# Patient Record
Sex: FEMALE | Race: WHITE | NOT HISPANIC OR LATINO | Employment: PART TIME | URBAN - METROPOLITAN AREA
[De-identification: names, ages, dates, MRNs, and addresses within clinical notes are randomized per-mention and may not be internally consistent; named-entity substitution may affect disease eponyms.]

---

## 2018-03-23 ENCOUNTER — OFFICE VISIT (OUTPATIENT)
Dept: OBSTETRICS AND GYNECOLOGY | Facility: CLINIC | Age: 38
End: 2018-03-23
Payer: COMMERCIAL

## 2018-03-23 VITALS
WEIGHT: 140.63 LBS | HEIGHT: 62 IN | RESPIRATION RATE: 13 BRPM | SYSTOLIC BLOOD PRESSURE: 130 MMHG | HEART RATE: 69 BPM | DIASTOLIC BLOOD PRESSURE: 82 MMHG | BODY MASS INDEX: 25.88 KG/M2

## 2018-03-23 DIAGNOSIS — Z01.419 WELL WOMAN EXAM WITH ROUTINE GYNECOLOGICAL EXAM: Primary | ICD-10-CM

## 2018-03-23 DIAGNOSIS — Z12.4 CERVICAL CANCER SCREENING: ICD-10-CM

## 2018-03-23 DIAGNOSIS — N92.0 MENORRHAGIA WITH REGULAR CYCLE: ICD-10-CM

## 2018-03-23 PROCEDURE — 88175 CYTOPATH C/V AUTO FLUID REDO: CPT

## 2018-03-23 PROCEDURE — 99385 PREV VISIT NEW AGE 18-39: CPT | Mod: S$GLB,,, | Performed by: OBSTETRICS & GYNECOLOGY

## 2018-03-23 PROCEDURE — 99999 PR PBB SHADOW E&M-NEW PATIENT-LVL III: CPT | Mod: PBBFAC,,, | Performed by: OBSTETRICS & GYNECOLOGY

## 2018-03-23 PROCEDURE — 87624 HPV HI-RISK TYP POOLED RSLT: CPT

## 2018-03-23 RX ORDER — GUAIFENESIN AND PHENYLEPHRINE HCL 400; 10 MG/1; MG/1
TABLET ORAL
COMMUNITY
End: 2019-06-25

## 2018-03-23 NOTE — PROGRESS NOTES
Subjective:    Patient ID: Awilda Mckeon is a 37 y.o. y.o. female.     Chief Complaint: Annual Well Woman Exam     History of Present Illness:  Awilda HANLEY presents today for Annual Well Woman exam. She describes her menses as regular every month without intermenstrual spotting, but cycles have always been heavy and last about 7 days.  She denies pelvic pain.  She denies breast tenderness, masses, nipple discharge. She denies difficulty with urination or bowel movements. She denies menopausal symptoms such as hotflashes, vaginal dryness, and night sweats. She denies bloating, early satiety, or weight changes. She is sexually active. Contraception is by oral contraceptives (estrogen/progesterone).      Menstrual History:   Patient's last menstrual period was 2018..     OB History      Para Term  AB Living    2 2 2          SAB TAB Ectopic Multiple Live Births                       The following portions of the patient's history were reviewed and updated as appropriate: allergies, current medications, past family history, past medical history, past social history, past surgical history and problem list.    ROS:   CONSTITUTIONAL: sweats; Negative for fever, chills, weakness, fatigue, weight loss, weight gain  ENT: hearing loss, negative for sore throat, nasal congestion, nasal discharge, epistaxis, tinnitus,   EYES: negative for blurry vision, decreased vision, loss of vision, eye pain, diplopia, photophobia, discharge  SKIN: rash, itching, Negative for acne or irregular hair growth  RESPIRATORY: negative for cough, hemoptysis, shortness of breath, pleuritic chest pain, wheezing  CARDIOVASCULAR: negative for chest pain, dyspnea on exertion, orthopnea, paroxysmal nocturnal dyspnea, edema, palpitations  BREAST: negative for breast  tenderness, breast mass, nipple discharge, or skin changes  GASTROINTESTINAL: negative for abdominal pain, flank pain, nausea, vomiting, diarrhea, constipation, black  stool, blood in stool  GENITOURINARY: menorrhagia with dysmenorrhea; negative for abnormal vaginal bleeding, amenorrhea, decreased libido, dysuria, genital sores, hematuria, incontinence, pelvic pain, urinary frequency, vaginal discharge  HEMATOLOGIC/LYMPHATIC: negative for swollen lymph nodes, bleeding, bruising  MUSCULOSKELETAL: joint pain, joint swelling, negative for back pain, joint stiffness, muscle pain, muscle weakness  NEUROLOGICAL: negative for dizzy/vertigo, headache, focal weakness, numbness/tingling, speech problems, loss of consciousness, confusion, memory loss  BEHAVORIAL/PSYCH: negative for anxiety, depression, psychosis  ENDOCRINE: negative for polydipsia/polyuria, palpitations, skin changes, temperature intolerance, unexpected weight changes      Objective:    Vital Signs:  Vitals:    03/23/18 1157   BP: 130/82   Pulse: 69   Resp: 13       Physical Exam:  General:  alert, cooperative, no distress   Skin:  Skin color, texture, turgor normal. No rashes or lesions   HEENT:  conjunctivae/corneas clear. PERRL.   Neck: supple, trachea midline, no adenopathy or thyromegally   Respiratory:  Normal effort   Breasts:  no discharge, erythema, tenderness, or palpable masses; no axillary lymphadenopathy   Abdomen:  soft, nontender, no palpable masses   Pelvis: External genitalia: normal general appearance  Urinary system: urethral meatus normal, bladder nontender  Vaginal: normal mucosa without prolapse or lesions  Cervix: normal appearance  Uterus: normal size, shape, position  Adnexa: normal size, nontender bilaterally   Extremities: Normal ROM; no edema, no cyanosis   Neurologial: Normal strength and tone. No focal numbness or weakness.   Psychiatric: normal mood, speech, dress, and thought processes         Assessment:       Healthy female exam.     1. Well woman exam with routine gynecological exam    2. Cervical cancer screening    3. Menorrhagia with regular cycle          Plan:      Well woman exam with  routine gynecological exam    Cervical cancer screening  -     Liquid-based pap smear, screening  -     HPV High Risk Genotypes, PCR    Menorrhagia with regular cycle    Discussed medicine options for menorrhagia as well as surgical options including Novasure.  Pt will research Novasure more at home and contact clinic.    COUNSELING:  Awilda HANLEY was counseled on STD pevention, use and side-effects of various contraceptive measures, A.C.O.G. Pap guidelines and recommendations for yearly pelvic exams in addition to recommendations for monthly self breast exams; to see her PCP for other health maintenance.

## 2018-03-28 LAB
HPV16 AG SPEC QL: NEGATIVE
HPV16+18+H RISK 12 DNA CVX-IMP: NEGATIVE
HPV18 DNA SPEC QL NAA+PROBE: NEGATIVE

## 2019-06-25 ENCOUNTER — OFFICE VISIT (OUTPATIENT)
Dept: OBSTETRICS AND GYNECOLOGY | Facility: CLINIC | Age: 39
End: 2019-06-25
Payer: COMMERCIAL

## 2019-06-25 VITALS
HEIGHT: 62 IN | SYSTOLIC BLOOD PRESSURE: 116 MMHG | BODY MASS INDEX: 25.52 KG/M2 | WEIGHT: 138.69 LBS | RESPIRATION RATE: 13 BRPM | DIASTOLIC BLOOD PRESSURE: 74 MMHG | HEART RATE: 65 BPM

## 2019-06-25 DIAGNOSIS — N92.0 MENORRHAGIA WITH REGULAR CYCLE: ICD-10-CM

## 2019-06-25 DIAGNOSIS — Z01.419 WELL WOMAN EXAM WITH ROUTINE GYNECOLOGICAL EXAM: Primary | ICD-10-CM

## 2019-06-25 PROCEDURE — 99395 PR PREVENTIVE VISIT,EST,18-39: ICD-10-PCS | Mod: S$GLB,,, | Performed by: OBSTETRICS & GYNECOLOGY

## 2019-06-25 PROCEDURE — 99395 PREV VISIT EST AGE 18-39: CPT | Mod: S$GLB,,, | Performed by: OBSTETRICS & GYNECOLOGY

## 2019-06-25 PROCEDURE — 99999 PR PBB SHADOW E&M-EST. PATIENT-LVL III: CPT | Mod: PBBFAC,,, | Performed by: OBSTETRICS & GYNECOLOGY

## 2019-06-25 PROCEDURE — 99999 PR PBB SHADOW E&M-EST. PATIENT-LVL III: ICD-10-PCS | Mod: PBBFAC,,, | Performed by: OBSTETRICS & GYNECOLOGY

## 2019-06-25 NOTE — PROGRESS NOTES
Subjective:    Patient ID: Awilda Mckeon is a 38 y.o. y.o. female.     Chief Complaint: Annual Well Woman Exam     History of Present Illness:  Awilda HANLEY presents today for Annual Well Woman exam. She describes her menses as regular every month without intermenstrual spotting and usually lasting 6 to 8 days but periods are very heavy with passage of clots. She is interested in surgical management with possible Novasure ablation.She denies pelvic pain.  She denies breast tenderness, masses, nipple discharge. She denies difficulty with urination or bowel movements. She reports menopausal symptoms such as hotflashes, vaginal dryness, and night sweats. She denies bloating, early satiety, or weight changes. She is sexually active. Contraception is by vasectomy.      Menstrual History:   Patient's last menstrual period was 2019..     OB History    :  2  Para:  2  Term:  2            The following portions of the patient's history were reviewed and updated as appropriate: allergies, current medications, past family history, past medical history, past social history, past surgical history and problem list.    ROS:   CONSTITUTIONAL: diaphoresis, Denies: fever, chills, weakness, fatigue, weight loss, weight gain  ENT: hearing loss, Denies: sore throat, nasal congestion, nasal discharge, epistaxis, tinnitus  EYES: negative for blurry vision, decreased vision, loss of vision, eye pain, diplopia, photophobia, discharge  SKIN: itching, Denies: rash, hives  RESPIRATORY: negative for cough, hemoptysis, shortness of breath, pleuritic chest pain, wheezing  CARDIOVASCULAR: negative for chest pain, dyspnea on exertion, orthopnea, paroxysmal nocturnal dyspnea, edema, palpitations  BREAST: negative for breast  tenderness, breast mass, nipple discharge, or skin changes  GASTROINTESTINAL: negative for abdominal pain, flank pain, nausea, vomiting, diarrhea, constipation, black stool, blood in stool  GENITOURINARY: heavy  menses, Denies: dysuria, frequency/urgency, hematuria, genital discharge, vaginal bleeding, irregular menses, pelvic pain  HEMATOLOGIC/LYMPHATIC: negative for swollen lymph nodes, bleeding, bruising  MUSCULOSKELETAL: joint pain, joint swelling, Denies: back pain, muscle pain, muscle weakness  NEUROLOGICAL: negative for dizzy/vertigo, headache, focal weakness, numbness/tingling, speech problems, loss of consciousness, confusion, memory loss  BEHAVORIAL/PSYCH: negative for anxiety, depression, psychosis  ENDOCRINE: hot flashes, negative for polydipsia/polyuria, palpitations, skin changes, temperature intolerance, unexpected weight changes  ALLERGIC/IMMUNOLOGIC: negative for urticaria, hay fever, angioedema      Objective:    Vital Signs:  Vitals:    06/25/19 0937   BP: 116/74   Pulse: 65   Resp: 13       Physical Exam:  General:  alert, cooperative, no distress   Skin:  Skin color, texture, turgor normal. No rashes or lesions   HEENT:  extra ocular movement intact, sclera clear, anicteric   Neck: supple, trachea midline, no adenopathy or thyromegally   Respiratory:  Normal effort   Breasts:  no discharge, erythema, tenderness, or palpable masses; no axillary lymphadenopathy   Abdomen:  soft, nontender, no palpable masses   Pelvis: External genitalia: normal general appearance  Urinary system: urethral meatus normal, bladder nontender  Vaginal: normal mucosa without prolapse or lesions, presence of blood  Cervix: normal appearance  Uterus: normal size, shape, position  Adnexa: normal size, nontender bilaterally   Extremities: Normal ROM; no edema, no cyanosis   Neurologial: Normal strength and tone. No focal numbness or weakness.   Psychiatric: normal mood, speech, dress, and thought processes         Assessment:       Healthy female exam.     1. Well woman exam with routine gynecological exam    2. Menorrhagia with regular cycle          Plan:      Well woman exam with routine gynecological exam    Menorrhagia with  regular cycle  -     US Transvaginal Non OB; Future; Expected date: 06/25/2019        Pap (3/2018) NEM/ HPV negative with benign examination today.  Up to date.     Patient ultimately desires to probably proceed with Novasure ablation.  Will schedule ultrasound.  Then will need EMB prior to surgery.     COUNSELING:  Awilda HANLEY was counseled on A.C.O.G. Pap guidelines and recommendations for yearly pelvic exams in addition to recommendations for monthly self breast exams; to see her PCP for other health maintenance.

## 2019-06-26 DIAGNOSIS — N92.0 MENORRHAGIA WITH REGULAR CYCLE: Primary | ICD-10-CM

## 2019-06-26 RX ORDER — TRANEXAMIC ACID 650 MG/1
1300 TABLET ORAL 3 TIMES DAILY
Qty: 30 TABLET | Refills: 2 | Status: SHIPPED | OUTPATIENT
Start: 2019-06-26 | End: 2019-07-01

## 2019-06-26 NOTE — PROGRESS NOTES
Florian called to patient's pharmacy.      Case request for hysteroscopy with D&C and Novasure ablation placed.

## 2019-07-05 ENCOUNTER — TELEPHONE (OUTPATIENT)
Dept: OBSTETRICS AND GYNECOLOGY | Facility: CLINIC | Age: 39
End: 2019-07-05

## 2019-07-08 NOTE — TELEPHONE ENCOUNTER
D&C/ Ablation scheduled for 10/4/19 per case ID: [id number].  Preop and preadmit appt's scheduled and discussed w/ pt.  Pt verbalized understanding.  Angie in surgery notified of date and time.

## 2019-09-25 PROBLEM — N92.0 MENORRHAGIA WITH REGULAR CYCLE: Status: ACTIVE | Noted: 2019-09-25

## 2019-09-25 PROBLEM — F17.210 TOBACCO DEPENDENCE DUE TO CIGARETTES: Status: ACTIVE | Noted: 2019-09-25

## 2019-09-25 PROBLEM — Z00.00 ROUTINE MEDICAL EXAM: Status: ACTIVE | Noted: 2019-09-25

## 2019-09-25 RX ORDER — TRANEXAMIC ACID 650 MG/1
1 TABLET ORAL DAILY
COMMUNITY
Start: 2019-07-09 | End: 2019-10-01

## 2019-09-25 NOTE — PROGRESS NOTES
"FAMILY MEDICINE    Patient Active Problem List   Diagnosis    Menorrhagia with regular cycle    Routine medical exam    Tobacco dependence due to cigarettes    Chronic idiopathic constipation    Psoriatic arthritis    Gastroesophageal reflux disease without esophagitis       CC:   Chief Complaint   Patient presents with    [facility]       HPI: Awilda Mckeon is a 39 y.o. female  - new patient smoker (20 yo 1/2 ppd) with menorrhagia planning for endometrial ablation with Dr. Chavez 10/6/19 and psoratic arthritis presents to establish care and routine wellness visit. She reports that she had been feeling fatigues and "just not myself" and concerns with heartburn.       HEALTH MAINTENANCE:   Health Maintenance   Topic Date Due    Lipid Panel  1980    TETANUS VACCINE  07/12/1998    Pneumococcal Vaccine (Medium Risk) (1 of 1 - PPSV23) 07/12/1999    Pap Smear with HPV Cotest  03/23/2023       ROS: Review of Systems   Constitutional: Positive for fatigue.        Otherwise negative     HENT: Negative.    Eyes: Negative.    Respiratory: Negative.    Cardiovascular: Negative.    Gastrointestinal: Positive for constipation. Negative for abdominal distention, abdominal pain, diarrhea, nausea, rectal pain and vomiting.        Heartburn   Endocrine: Negative.    Genitourinary: Positive for menstrual problem.        Otherwise negative   Musculoskeletal: Negative.    Skin: Positive for rash (psoriatic rash right inner thigh).        Otherwise negative       Allergic/Immunologic: Negative.    Neurological: Negative.    Hematological: Negative.    Psychiatric/Behavioral: Negative.        ALLERGIES:   Review of patient's allergies indicates:  No Known Allergies    MEDS:     Current Outpatient Medications:     UNABLE TO FIND, Take 1 tablet by mouth once daily. medication name: xanthromax, Disp: , Rfl:     omeprazole (PRILOSEC) 40 MG capsule, Take 1 capsule (40 mg total) by mouth once daily., Disp: 30 capsule, " "Rfl: 0    tranexamic acid (LYSTEDA) 650 mg tablet, Take 1 tablet by mouth once daily., Disp: , Rfl:     Past Medical History:   Diagnosis Date    Abnormal Pap smear of cervix years ago    cyro done, nl since    Arthritis     Menorrhagia 2019    Mitral valve prolapse     Pregnancy         Psoriasis        Past Surgical History:   Procedure Laterality Date    COLPOSCOPY      remote    RHINOPLASTY      TONSILLECTOMY      TYMPANOSTOMY TUBE PLACEMENT         Family History   Problem Relation Age of Onset    Hypertension Mother     Hyperlipidemia Father     No Known Problems Sister     Epilepsy Daughter     No Known Problems Son     No Known Problems Maternal Grandmother     No Known Problems Maternal Grandfather     No Known Problems Paternal Grandmother     No Known Problems Paternal Grandfather     Breast cancer Neg Hx     Colon cancer Neg Hx     Ovarian cancer Neg Hx        Social History     Tobacco Use    Smoking status: Current Every Day Smoker     Packs/day: 0.50     Years: 20.00     Pack years: 10.00     Types: Cigarettes    Smokeless tobacco: Never Used   Substance Use Topics    Alcohol use: Yes     Alcohol/week: 2.0 standard drinks     Types: 1 Cans of beer, 1 Shots of liquor per week     Frequency: 2-4 times a month     Drinks per session: 3 or 4     Binge frequency: Less than monthly     Comment: socially    Drug use: No       Social History     Social History Narrative    . Works at dental office. 1 son and 1 daughter       OBJECTIVE:   Vitals:    19 1300   BP: 132/84   BP Location: Left arm   Patient Position: Sitting   BP Method: Medium (Manual)   Pulse: 77   Temp: 98.3 °F (36.8 °C)   TempSrc: Oral   SpO2: 99%   Weight: 63.2 kg (139 lb 4.8 oz)   Height: 5' 2" (1.575 m)     Body mass index is 25.48 kg/m².    Physical Exam   Constitutional: No distress.   HENT:   Head: Normocephalic and atraumatic.   Eyes: Pupils are equal, round, and reactive to light. EOM are " normal.   Neck: Normal range of motion. Neck supple.   Cardiovascular: Normal rate, regular rhythm, normal heart sounds and intact distal pulses. Exam reveals no gallop and no friction rub.   No murmur heard.  Pulmonary/Chest: Effort normal and breath sounds normal. She has no decreased breath sounds. She has no wheezes. She has no rhonchi. She has no rales.   Abdominal: Soft. Bowel sounds are normal. She exhibits no distension. There is no tenderness.   Musculoskeletal: She exhibits no edema.   Neurological: She is alert.   Skin: Skin is warm. Capillary refill takes less than 2 seconds.         Depression Patient Health Questionnaire 9/26/2019   Over the last two weeks how often have you been bothered by little interest or pleasure in doing things 1   Over the last two weeks how often have you been bothered by feeling down, depressed or hopeless 1   PHQ-2 Total Score 2       PERTINENT RESULTS:   No recent labs     6/26/2019       Narrative     EXAMINATION:  US PELVIS COMP WITH TRANSVAG NON-OB (XPD)    CLINICAL HISTORY:  Excessive and frequent menstruation with regular cycle    TECHNIQUE:  Transabdominal sonography of the pelvis was performed, followed by transvaginal sonography to better evaluate the uterus and ovaries.    FINDINGS:  The uterus measures 8.2 x 4.4 x 5.1 cm and has a homogeneous echotexture.  The endometrial stripe measures 0.3 cm.  The right and left ovaries measure 2.8 x 1.9 x 2.4 cm and 2.3 x 1.7 x 2.3 cm respectively.  The ovaries have a normal appearance.  There is no adnexal mass or significant amount of fluid within the pelvis.      Impression       No acute findings.         ASSESSMENT/PLAN:  Problem List Items Addressed This Visit        Renal/    Menorrhagia with regular cycle    Overview     - followed by Gynecology and plan for endometrial ablation         Relevant Orders    TSH    T4, free       GI    Chronic idiopathic constipation    Current Assessment & Plan     - counseling on  constipation  - dietary management and goal  - discussed seeing GI if no improvement         Gastroesophageal reflux disease without esophagitis    Current Assessment & Plan     - counseling on GERD  - dietary management, smoking cessation and medication  - trial Omeprazole 40 mg daily  - discussed seeing GI if no improvement         Relevant Medications    omeprazole (PRILOSEC) 40 MG capsule       Orthopedic    Psoriatic arthritis    Current Assessment & Plan     - seen by Rheumatology in the past  - pt opted to stay off of medications  - fairly well controlled  - information given on the anti-inflammatory diet            Other    Routine medical exam - Primary    Current Assessment & Plan     - counseling on current recommendations for breast cancer screening. Pt normal risk and recommend start 45-49 yo  - counseling on current recommendations for cervical cancer screening. Pt up to date*           Relevant Orders    Comprehensive metabolic panel    Lipid panel    CBC auto differential    TSH    T4, free    Tobacco dependence due to cigarettes    Current Assessment & Plan     - recommend quit smoking  - pt readiness 5/10 and plans to stop by 41 yo  - discussed smoking cessation program available and pt declined             Other Visit Diagnoses     Encounter for lipid screening for cardiovascular disease        Relevant Orders    Lipid panel    Screening for metabolic disorder        Relevant Orders    Comprehensive metabolic panel    Screening, iron deficiency anemia        Relevant Orders    CBC auto differential    Screening for thyroid disorder        Relevant Orders    TSH    T4, free          ORDERS:   Orders Placed This Encounter    Pneumococcal Polysaccharide Vaccine (23 Valent) (SQ/IM)    Tdap Vaccine    Influenza - Quadrivalent (PF)    Comprehensive metabolic panel    Lipid panel    CBC auto differential    TSH    T4, free    omeprazole (PRILOSEC) 40 MG capsule     Vaccines recommended: Tdap,  Pneumovax and flu vaccine.     Follow-up in yearly or sooner if any concerns.     Dr. Jenn Briones D.O.   [facility]

## 2019-09-25 NOTE — PATIENT INSTRUCTIONS
1. Vaccine today: Pneumonia (every 10 years), flu (yearly) and Tetanus vaccine (every 10 years)  2. Start Omeprazole 40 mg daily  - take first thing in the morning 30 mins before your plan to eat  - reduces acid  3. If does not improved, I recommend that you see Dr. Alexa Palacios -[phone]  4. [facility]  M-F 6AM-5PM  Sat 8AM to noon

## 2019-09-26 ENCOUNTER — OFFICE VISIT (OUTPATIENT)
Dept: FAMILY MEDICINE | Facility: CLINIC | Age: 39
End: 2019-09-26
Payer: COMMERCIAL

## 2019-09-26 VITALS
SYSTOLIC BLOOD PRESSURE: 132 MMHG | TEMPERATURE: 98 F | WEIGHT: 139.31 LBS | HEIGHT: 62 IN | DIASTOLIC BLOOD PRESSURE: 84 MMHG | BODY MASS INDEX: 25.64 KG/M2 | OXYGEN SATURATION: 99 % | HEART RATE: 77 BPM

## 2019-09-26 DIAGNOSIS — K21.9 GASTROESOPHAGEAL REFLUX DISEASE WITHOUT ESOPHAGITIS: ICD-10-CM

## 2019-09-26 DIAGNOSIS — Z13.29 SCREENING FOR THYROID DISORDER: ICD-10-CM

## 2019-09-26 DIAGNOSIS — Z13.0 SCREENING, IRON DEFICIENCY ANEMIA: ICD-10-CM

## 2019-09-26 DIAGNOSIS — L40.50 PSORIATIC ARTHRITIS: ICD-10-CM

## 2019-09-26 DIAGNOSIS — Z00.00 ROUTINE MEDICAL EXAM: Primary | ICD-10-CM

## 2019-09-26 DIAGNOSIS — Z13.6 ENCOUNTER FOR LIPID SCREENING FOR CARDIOVASCULAR DISEASE: ICD-10-CM

## 2019-09-26 DIAGNOSIS — F17.210 TOBACCO DEPENDENCE DUE TO CIGARETTES: ICD-10-CM

## 2019-09-26 DIAGNOSIS — N92.0 MENORRHAGIA WITH REGULAR CYCLE: ICD-10-CM

## 2019-09-26 DIAGNOSIS — K59.04 CHRONIC IDIOPATHIC CONSTIPATION: ICD-10-CM

## 2019-09-26 DIAGNOSIS — Z13.228 SCREENING FOR METABOLIC DISORDER: ICD-10-CM

## 2019-09-26 DIAGNOSIS — Z13.220 ENCOUNTER FOR LIPID SCREENING FOR CARDIOVASCULAR DISEASE: ICD-10-CM

## 2019-09-26 PROCEDURE — 90471 FLU VACCINE (QUAD) GREATER THAN OR EQUAL TO 3YO PRESERVATIVE FREE IM: ICD-10-PCS | Mod: S$GLB,,, | Performed by: FAMILY MEDICINE

## 2019-09-26 PROCEDURE — 99999 PR PBB SHADOW E&M-EST. PATIENT-LVL IV: CPT | Mod: PBBFAC,,, | Performed by: FAMILY MEDICINE

## 2019-09-26 PROCEDURE — 99385 PREV VISIT NEW AGE 18-39: CPT | Mod: 25,S$GLB,, | Performed by: FAMILY MEDICINE

## 2019-09-26 PROCEDURE — 90472 PNEUMOCOCCAL POLYSACCHARIDE VACCINE 23-VALENT =>2YO SQ IM: ICD-10-PCS | Mod: S$GLB,,, | Performed by: FAMILY MEDICINE

## 2019-09-26 PROCEDURE — 90686 IIV4 VACC NO PRSV 0.5 ML IM: CPT | Mod: S$GLB,,, | Performed by: FAMILY MEDICINE

## 2019-09-26 PROCEDURE — 90732 PPSV23 VACC 2 YRS+ SUBQ/IM: CPT | Mod: S$GLB,,, | Performed by: FAMILY MEDICINE

## 2019-09-26 PROCEDURE — 90472 IMMUNIZATION ADMIN EACH ADD: CPT | Mod: S$GLB,,, | Performed by: FAMILY MEDICINE

## 2019-09-26 PROCEDURE — 90686 FLU VACCINE (QUAD) GREATER THAN OR EQUAL TO 3YO PRESERVATIVE FREE IM: ICD-10-PCS | Mod: S$GLB,,, | Performed by: FAMILY MEDICINE

## 2019-09-26 PROCEDURE — 90471 IMMUNIZATION ADMIN: CPT | Mod: S$GLB,,, | Performed by: FAMILY MEDICINE

## 2019-09-26 PROCEDURE — 99999 PR PBB SHADOW E&M-EST. PATIENT-LVL IV: ICD-10-PCS | Mod: PBBFAC,,, | Performed by: FAMILY MEDICINE

## 2019-09-26 PROCEDURE — 90732 PNEUMOCOCCAL POLYSACCHARIDE VACCINE 23-VALENT =>2YO SQ IM: ICD-10-PCS | Mod: S$GLB,,, | Performed by: FAMILY MEDICINE

## 2019-09-26 PROCEDURE — 99385 PR PREVENTIVE VISIT,NEW,18-39: ICD-10-PCS | Mod: 25,S$GLB,, | Performed by: FAMILY MEDICINE

## 2019-09-26 PROCEDURE — 90715 TDAP VACCINE GREATER THAN OR EQUAL TO 7YO IM: ICD-10-PCS | Mod: S$GLB,,, | Performed by: FAMILY MEDICINE

## 2019-09-26 PROCEDURE — 90715 TDAP VACCINE 7 YRS/> IM: CPT | Mod: S$GLB,,, | Performed by: FAMILY MEDICINE

## 2019-09-26 RX ORDER — OMEPRAZOLE 40 MG/1
40 CAPSULE, DELAYED RELEASE ORAL DAILY
Qty: 30 CAPSULE | Refills: 0 | Status: SHIPPED | OUTPATIENT
Start: 2019-09-26 | End: 2020-08-19

## 2019-09-26 NOTE — ASSESSMENT & PLAN NOTE
- seen by Rheumatology in the past  - pt opted to stay off of medications  - fairly well controlled  - information given on the anti-inflammatory diet

## 2019-09-26 NOTE — ASSESSMENT & PLAN NOTE
- counseling on constipation  - dietary management and goal  - discussed seeing GI if no improvement

## 2019-09-26 NOTE — ASSESSMENT & PLAN NOTE
- recommend quit smoking  - pt readiness 5/10 and plans to stop by 41 yo  - discussed smoking cessation program available and pt declined

## 2019-09-26 NOTE — ASSESSMENT & PLAN NOTE
- counseling on GERD  - dietary management, smoking cessation and medication  - trial Omeprazole 40 mg daily  - discussed seeing GI if no improvement

## 2019-10-01 ENCOUNTER — HOSPITAL ENCOUNTER (OUTPATIENT)
Dept: PREADMISSION TESTING | Facility: HOSPITAL | Age: 39
Discharge: HOME | End: 2019-10-01
Attending: OBSTETRICS & GYNECOLOGY
Payer: COMMERCIAL

## 2019-10-01 ENCOUNTER — ANESTHESIA EVENT (OUTPATIENT)
Dept: SURGERY | Facility: HOSPITAL | Age: 39
End: 2019-10-01
Payer: COMMERCIAL

## 2019-10-01 ENCOUNTER — OFFICE VISIT (OUTPATIENT)
Dept: OBSTETRICS AND GYNECOLOGY | Facility: CLINIC | Age: 39
End: 2019-10-01
Payer: COMMERCIAL

## 2019-10-01 VITALS
BODY MASS INDEX: 25.8 KG/M2 | SYSTOLIC BLOOD PRESSURE: 108 MMHG | HEIGHT: 62 IN | DIASTOLIC BLOOD PRESSURE: 66 MMHG | RESPIRATION RATE: 14 BRPM | HEART RATE: 82 BPM | WEIGHT: 140.19 LBS

## 2019-10-01 DIAGNOSIS — N92.0 MENORRHAGIA WITH REGULAR CYCLE: Primary | ICD-10-CM

## 2019-10-01 DIAGNOSIS — Z01.818 PREOP TESTING: ICD-10-CM

## 2019-10-01 PROCEDURE — 99999 PR PBB SHADOW E&M-EST. PATIENT-LVL III: CPT | Mod: PBBFAC,,, | Performed by: OBSTETRICS & GYNECOLOGY

## 2019-10-01 PROCEDURE — 99499 NO LOS: ICD-10-PCS | Mod: S$GLB,,, | Performed by: OBSTETRICS & GYNECOLOGY

## 2019-10-01 PROCEDURE — 99499 UNLISTED E&M SERVICE: CPT | Mod: S$GLB,,, | Performed by: OBSTETRICS & GYNECOLOGY

## 2019-10-01 PROCEDURE — 99999 PR PBB SHADOW E&M-EST. PATIENT-LVL III: ICD-10-PCS | Mod: PBBFAC,,, | Performed by: OBSTETRICS & GYNECOLOGY

## 2019-10-01 NOTE — H&P
Pre-Operative History and Physical   Obstetrics and Gynecology    Awilda Mckeon is a 39 y.o. female  for preop examination.  She is scheduled for a hysteroscopy with D&C and endometrial ablation on 10/4/19. She describes her menses as regular every month without intermenstrual spotting and usually lasting 6 to 8 days but periods are very heavy with passage of clots. She desires surgical management with Novasure ablation.    Contraception is by vasectomy.       ROS:  GENERAL: Denies weight gain or weight loss. Feeling well overall.   SKIN: Denies rash or lesions.   HEAD: Denies head injury or headache.   NODES: Denies enlarged lymph nodes.   CHEST: Denies chest pain or shortness of breath.   CARDIOVASCULAR: Denies palpitations or left sided chest pain.   ABDOMEN: No abdominal pain, constipation, diarrhea, nausea, vomiting or rectal bleeding.   URINARY: No frequency, dysuria, hematuria, or burning on urination.  REPRODUCTIVE: See HPI.   BREASTS: The patient performs breast self-examination and denies pain, lumps, or nipple discharge.   HEMATOLOGIC: No easy bruisability or excessive bleeding with the exception of menstrual cycles.  MUSCULOSKELETAL: Denies joint pain or swelling.   NEUROLOGIC: Denies syncope or weakness.   PSYCHIATRIC: Denies depression, anxiety or mood swings.    Past Medical History:   Diagnosis Date    Abnormal Pap smear of cervix years ago    cyro done, nl since    Arthritis     Menorrhagia 2019    Mitral valve prolapse     Pregnancy         Psoriasis      Past Surgical History:   Procedure Laterality Date    COLPOSCOPY      remote    RHINOPLASTY      TONSILLECTOMY      TYMPANOSTOMY TUBE PLACEMENT       Family History   Problem Relation Age of Onset    Hypertension Mother     Hyperlipidemia Father     No Known Problems Sister     Epilepsy Daughter     No Known Problems Son     No Known Problems Maternal Grandmother     No Known Problems Maternal Grandfather      No Known Problems Paternal Grandmother     No Known Problems Paternal Grandfather     Breast cancer Neg Hx     Colon cancer Neg Hx     Ovarian cancer Neg Hx      Review of patient's allergies indicates:  No Known Allergies    Current Outpatient Medications:     UNABLE TO FIND, Take 1 tablet by mouth once daily. medication name: xanthromax, Disp: , Rfl:     omeprazole (PRILOSEC) 40 MG capsule, Take 1 capsule (40 mg total) by mouth once daily. (Patient not taking: Reported on 10/1/2019), Disp: 30 capsule, Rfl: 0    tranexamic acid (LYSTEDA) 650 mg tablet, Take 1 tablet by mouth once daily., Disp: , Rfl:   Outpatient Medications Marked as Taking for the 10/1/19 encounter (Office Visit) with Karlene Chavez MD   Medication Sig Dispense Refill    UNABLE TO FIND Take 1 tablet by mouth once daily. medication name: xanthromax       Social History     Tobacco Use    Smoking status: Current Every Day Smoker     Packs/day: 0.50     Years: 20.00     Pack years: 10.00     Types: Cigarettes    Smokeless tobacco: Never Used   Substance Use Topics    Alcohol use: Yes     Alcohol/week: 2.0 standard drinks     Types: 1 Cans of beer, 1 Shots of liquor per week     Frequency: 2-4 times a month     Drinks per session: 3 or 4     Binge frequency: Less than monthly     Comment: socially    Drug use: No         Last pap 3/2019 NEM  Last ultrasound 6/2019  The uterus measures 8.2 x 4.4 x 5.1 cm and has a homogeneous echotexture.  The endometrial stripe measures 0.3 cm.  The right and left ovaries measure 2.8 x 1.9 x 2.4 cm and 2.3 x 1.7 x 2.3 cm respectively.  The ovaries have a normal appearance.  There is no adnexal mass or significant amount of fluid within the pelvis.    Vitals:    10/01/19 1223   BP: 108/66   Pulse: 82   Resp: 14     General Appearance: Alert, appropriate appearance for age. No acute distress, Chest/Respiratory Exam: Normal chest wall and respirations. Clear to auscultation.   Cardiovascular Exam:  regular rate and rhythm   Gastrointestinal Exam: soft, NT  Pelvic Exam Female: Exam deferred.   Psychiatric Exam: Alert and oriented, appropriate affect.    Assessment: AUB (menorrhagia with regular cycle)    Plan: Hysteroscopy with D&C and Novasure Ablation    I have discussed the risks, benefits, indications, and alternatives of the procedure in detail.  The patient verbalizes her understanding.  All questions answered.  Consents signed.  The patient agrees to proceed to proceed as planned.

## 2019-10-01 NOTE — DISCHARGE INSTRUCTIONS
[facility]  Pre Admit Instructions    Day and Date of Procedure: Friday 10/4/19  Arrival time: 7am      · Call your doctor if you become ill before your surgery  · Someone will call you between 1 p.m. And 5 p.m.the workday before the procedure to give you an arrival time       - 7 a.m. To 5 p.m. Enter through Patient Registration Main Lobby  · You must have a responsible  to bring you home      DAY OF SURGERY:     12 Midnight - no solid foods after midnight     12 Midnight to 4 AM - may drink clear liquids (water, Gatorade, soft drinks, Jello, black coffee only with NO milk or creamer)     4 AM until surgery - nothing by mouth after 4 AM     May take morning medications as instructed with a sip of water.            630 AM Arrive at hospital for GYN surgery     530 AM Arrive at hospital for  surgery          NOTE: Do NOT shave prep the surgical area at home.  This may increase your risk of infection.     STOP ALL NSAIDS (Ibuprofen, Aleve, Aspirin, etc) 7 days prior to your surgery.         Please    · Do not wear makeup, jewelry, nail polish or body piercings  · Bring containers/solution for contacts, dentures, bridges - these and hearing aids will be removed before your procedure  · Do not bring cash, jewelry or valuables the day of your procedure   · No smoking at least 24 hours before your procedure  · Wear clothing that is comfortable and easy to take off and put on  · Do NOT shave for at least 5 days before your surgery    Review skin preparation handout before using. Shower with Hibiclens the Night before the procedure.                 Information about your stay (Please Review)    Before Surgery  1. Cafeteria Meals: 7am to 10am; 11am to 1:30 pm; Dinner/Supper must may be ordered between 11:00 am and 4 pm from the \[facility] [place] [facility]\"" [employer]come After [facility] Menu. Food will be available to  between 5 pm and 6 pm. The kitchen phone extension is [phone].  2. Your doctor may order and review labs,  x-rays, ECG or other tests as a pre-surgery workup and will call you if there is need for follow up.  3. No smoking inside or outside the hospital on hospital grounds.  4. Wear clothing that is easy to take off and put on.  The hospital will provide you with a gown.  5. You may bring robe, slippers, nightwear, and toiletries (toothbrush, toothpaste, makeup).  6. If your doctor orders a Fleets Enema or other prep, follow package and/or doctors orders.  7. Brush your teeth and rinse your mouth the morning of surgery, but dont swallow the water.  8. The nurse will ask questions and check your condition.  The doctor may luis e your surgical site.  9. Compression boots may be put on your calves to reduce the risk of blood clots.  10. The doctor may order medicine to help you relax before surgery.  After Surgery  1. The nurse will check your temperature, breathing, blood pressure, heart rate, IV site, and surgery site.  2. A diet will be ordered-most start with ice chips and then advance slowly to other foods.  3. If you have IV fluids the IV pump will beep to let the nurse know that she needs to check it.  4. You may have a urinary catheter and staff may measure your oral intake and urine output.  5. Pain medication may be ordered by the doctor after surgery.  If you have a pain management device tell your caretakers not to press the button because of OVERDOSE RISK.  6. When the nurse or doctor tells you it is okay to get out of bed, ask for help until you are stable.  7. The nurse may ask you to turn, cough, and deep breathe to prevent lung problems.  You can use a pillow to hold your incision when you deep breathe or cough to reduce pain.  8. The nurse will give you discharge instructions--incision care, symptoms to report to your doctor, and your follow-up appointment when you are discharged.  You cannot drive yourself home.  Goal for Discharge from One Day Surgery  · Control pain with an oral medication  · Walk  without feeling dizzy or weak  · Tolerate liquids well  · Urinate without difficulty    Things you can do to  Reduce the Risk of Infections or Complications  Wash Hands and use Waterless Hand Sanitizers  · Wash hands frequently with soap and warm water for at least 15 seconds.   · Use hand sanitizers (alcohol based) often at home and in public if hands are not visibly soiled  Take Antibiotic Exactly as Prescribed  · Do not stop antibiotics too soon; you risk developing infection resistant to antibiotics  · Take your antibiotic even if you are feeling better and even if they upset your stomach  · Call the doctor if you cant tolerate the antibiotic or you have an allergic reaction  Stay Healthy  · Take medicines as prescribed by your doctor  · Keep your diabetes under control - diet and medication  · Get enough rest, exercise and eat a healthy diet  Keep the Wound Clean and Dry  · Wash hands before and after taking care of the incision (cut)  · Wash hands when you remove a dressing, before you touch/apply a new dressing  · Shower and clean incision with antibacterial soap and rinse well if the doctor approves  · Allow the cut to dry completely before putting on a clean dressing  · Do not touch the part of the bandage that will cover the incision  · Do not use ointments unless your doctor tells you to-can promote bacterial growth  · If ordered, put ointment directly on the dressing-do not touch the end of the tube  · Do not scrub, remove scabs, or leave a damp dressing on the incision  · Do not use peroxide or alcohol to clean the incision unless the doctor tells you to   · Do not let children, pets or anyone else contaminate the incision  Stop Smoking To Prevent Infection  · Stop smoking-Centers for Disease Control recommends 30 days before surgery  · Smokers get more infections after surgery-studies have shown 6 times the risk  · Smokers have more scarring and heal slower-open wounds get infected easier  Prevent  Respiratory complications  · Stop smoking  · Turn, cough, and deep breathe even if you have some pain when you do so.  · Splint your incision with a pillow when you cough/deep breath, to help control pain.  · Do not lie in one position for long periods of time.   Prevent Blood Clots  · When you wake move your legs, flex your feet, rotate your ankles, wiggle your toes  · Get up when the doctor says its ok.  Dangle your feet from the side of the bed  · Report symptoms-leg pain, redness/swelling, warm to touch; fever; shortness of breath, chest pain, severe upper back pain.

## 2019-10-01 NOTE — ANESTHESIA PREPROCEDURE EVALUATION
10/01/2019  Awilda Mckeon is a 39 y.o., female.    Anesthesia Evaluation    I have reviewed the Patient Summary Reports.    I have reviewed the Nursing Notes.   I have reviewed the Medications.     Review of Systems  Anesthesia Hx:  No problems with previous Anesthesia    Social:  No Alcohol Use, Smoker    Hematology/Oncology:  Hematology Normal   Oncology Normal     EENT/Dental:EENT/Dental Normal   Cardiovascular:  Cardiovascular Normal Exercise tolerance: good     Pulmonary:  Pulmonary Normal    Renal/:  Renal/ Normal     Hepatic/GI:   GERD, well controlled    Musculoskeletal:  Musculoskeletal Normal    Neurological:  Neurology Normal    Endocrine:  Endocrine Normal    Dermatological:  Skin Normal    Psych:  Psychiatric Normal           Physical Exam  General:  Well nourished    Airway/Jaw/Neck:  Airway Findings: Mouth Opening: Normal Tongue: Normal  General Airway Assessment: Adult  Mallampati: II  TM Distance: Normal, at least 6 cm      Dental:  Dental Findings: In tact             Anesthesia Plan  Type of Anesthesia, risks & benefits discussed:  Anesthesia Type:  general  Patient's Preference:   Intra-op Monitoring Plan: standard ASA monitors  Intra-op Monitoring Plan Comments:   Post Op Pain Control Plan: per primary service following discharge from PACU and multimodal analgesia  Post Op Pain Control Plan Comments:   Induction:   IV  Beta Blocker:         Informed Consent: Patient understands risks and agrees with Anesthesia plan.  Questions answered. Anesthesia consent signed with patient.  ASA Score: 2     Day of Surgery Review of History & Physical: I have interviewed and examined the patient. I have reviewed the patient's H&P dated: 10/4/19. There are no significant changes.  H&P update referred to the surgeon.         Ready For Surgery From Anesthesia Perspective.

## 2019-10-03 ENCOUNTER — PATIENT MESSAGE (OUTPATIENT)
Dept: FAMILY MEDICINE | Facility: CLINIC | Age: 39
End: 2019-10-03

## 2019-10-04 ENCOUNTER — ANESTHESIA (OUTPATIENT)
Dept: SURGERY | Facility: HOSPITAL | Age: 39
End: 2019-10-04
Payer: COMMERCIAL

## 2019-10-04 PROCEDURE — 63600175 PHARM REV CODE 636 W HCPCS: Performed by: NURSE ANESTHETIST, CERTIFIED REGISTERED

## 2019-10-04 PROCEDURE — 00952 ANES VAG PX HYSTSC&/HSG: CPT | Mod: QZ | Performed by: NURSE ANESTHETIST, CERTIFIED REGISTERED

## 2019-10-04 PROCEDURE — 25000003 PHARM REV CODE 250: Performed by: NURSE ANESTHETIST, CERTIFIED REGISTERED

## 2019-10-04 RX ORDER — FENTANYL CITRATE 50 UG/ML
INJECTION, SOLUTION INTRAMUSCULAR; INTRAVENOUS
Status: DISCONTINUED | OUTPATIENT
Start: 2019-10-04 | End: 2019-10-04

## 2019-10-04 RX ORDER — GLYCOPYRROLATE 0.2 MG/ML
INJECTION INTRAMUSCULAR; INTRAVENOUS
Status: DISCONTINUED | OUTPATIENT
Start: 2019-10-04 | End: 2019-10-04

## 2019-10-04 RX ORDER — PROPOFOL 10 MG/ML
INJECTION, EMULSION INTRAVENOUS
Status: DISCONTINUED | OUTPATIENT
Start: 2019-10-04 | End: 2019-10-04

## 2019-10-04 RX ORDER — PROPOFOL 10 MG/ML
VIAL (ML) INTRAVENOUS CONTINUOUS PRN
Status: DISCONTINUED | OUTPATIENT
Start: 2019-10-04 | End: 2019-10-04

## 2019-10-04 RX ORDER — ACETAMINOPHEN 10 MG/ML
INJECTION, SOLUTION INTRAVENOUS
Status: DISCONTINUED | OUTPATIENT
Start: 2019-10-04 | End: 2019-10-04

## 2019-10-04 RX ORDER — LIDOCAINE HCL/PF 100 MG/5ML
SYRINGE (ML) INTRAVENOUS
Status: DISCONTINUED | OUTPATIENT
Start: 2019-10-04 | End: 2019-10-04

## 2019-10-04 RX ORDER — DEXAMETHASONE SODIUM PHOSPHATE 4 MG/ML
INJECTION, SOLUTION INTRA-ARTICULAR; INTRALESIONAL; INTRAMUSCULAR; INTRAVENOUS; SOFT TISSUE
Status: DISCONTINUED | OUTPATIENT
Start: 2019-10-04 | End: 2019-10-04

## 2019-10-04 RX ORDER — MIDAZOLAM HYDROCHLORIDE 1 MG/ML
INJECTION, SOLUTION INTRAMUSCULAR; INTRAVENOUS
Status: DISCONTINUED | OUTPATIENT
Start: 2019-10-04 | End: 2019-10-04

## 2019-10-04 RX ORDER — SODIUM CHLORIDE, SODIUM LACTATE, POTASSIUM CHLORIDE, CALCIUM CHLORIDE 600; 310; 30; 20 MG/100ML; MG/100ML; MG/100ML; MG/100ML
INJECTION, SOLUTION INTRAVENOUS CONTINUOUS PRN
Status: DISCONTINUED | OUTPATIENT
Start: 2019-10-04 | End: 2019-10-04

## 2019-10-04 RX ORDER — ONDANSETRON 2 MG/ML
INJECTION INTRAMUSCULAR; INTRAVENOUS
Status: DISCONTINUED | OUTPATIENT
Start: 2019-10-04 | End: 2019-10-04

## 2019-10-04 RX ADMIN — FENTANYL CITRATE 100 MCG: 50 INJECTION, SOLUTION INTRAMUSCULAR; INTRAVENOUS at 09:10

## 2019-10-04 RX ADMIN — PROPOFOL 150 MG: 10 INJECTION, EMULSION INTRAVENOUS at 09:10

## 2019-10-04 RX ADMIN — LIDOCAINE HYDROCHLORIDE 50 MG: 20 INJECTION, SOLUTION INTRAVENOUS at 09:10

## 2019-10-04 RX ADMIN — ACETAMINOPHEN 1000 MG: 10 INJECTION, SOLUTION INTRAVENOUS at 09:10

## 2019-10-04 RX ADMIN — DEXAMETHASONE SODIUM PHOSPHATE 8 MG: 4 INJECTION, SOLUTION INTRAMUSCULAR; INTRAVENOUS at 09:10

## 2019-10-04 RX ADMIN — MIDAZOLAM 2 MG: 1 INJECTION INTRAMUSCULAR; INTRAVENOUS at 09:10

## 2019-10-04 RX ADMIN — SODIUM CHLORIDE, SODIUM LACTATE, POTASSIUM CHLORIDE, AND CALCIUM CHLORIDE: .6; .31; .03; .02 INJECTION, SOLUTION INTRAVENOUS at 09:10

## 2019-10-04 RX ADMIN — ONDANSETRON 8 MG: 2 INJECTION, SOLUTION INTRAMUSCULAR; INTRAVENOUS at 09:10

## 2019-10-04 RX ADMIN — GLYCOPYRROLATE 0.2 MG: 0.2 INJECTION INTRAMUSCULAR; INTRAVENOUS at 09:10

## 2019-10-04 RX ADMIN — PROPOFOL 200 MCG/KG/MIN: 10 INJECTION, EMULSION INTRAVENOUS at 09:10

## 2019-10-04 NOTE — TRANSFER OF CARE
"Anesthesia Transfer of Care Note    Patient: Awilda Mckeon    Procedure(s) Performed: Procedure(s) (LRB):  ABLATION, ENDOMETRIUM, THERMAL, HYSTEROSCOPIC (N/A)  DILATION AND CURETTAGE, UTERUS (N/A)    Patient location: PACU    Anesthesia Type: general    Transport from OR: Transported from OR on 6-10 L/min O2 by face mask with adequate spontaneous ventilation    Post pain: adequate analgesia    Post assessment: no apparent anesthetic complications and tolerated procedure well    Post vital signs: stable    Level of consciousness: sedated    Nausea/Vomiting: no nausea/vomiting    Complications: none    Transfer of care protocol was followed      Last vitals:   Visit Vitals  /71   Pulse 84   Temp 36 °C (96.8 °F) (Tympanic)   Resp 16   Ht 5' 2" (1.575 m)   Wt 61.7 kg (136 lb 0.4 oz)   LMP 09/12/2019 (Exact Date)   SpO2 96%   Breastfeeding? No   BMI 24.88 kg/m²     "

## 2019-10-04 NOTE — ANESTHESIA POSTPROCEDURE EVALUATION
Anesthesia Post Evaluation    Patient: Awilda Mckeon    Procedure(s) Performed: Procedure(s) (LRB):  ABLATION, ENDOMETRIUM, THERMAL, HYSTEROSCOPIC (N/A)  DILATION AND CURETTAGE, UTERUS (N/A)    Final Anesthesia Type: general  Patient location during evaluation: PACU  Patient participation: Yes- Able to Participate  Level of consciousness: awake and alert, oriented and awake  Post-procedure vital signs: reviewed and stable  Pain management: adequate  Airway patency: patent  PONV status at discharge: No PONV  Anesthetic complications: no      Cardiovascular status: blood pressure returned to baseline, hemodynamically stable and stable  Respiratory status: unassisted, spontaneous ventilation and room air  Hydration status: euvolemic  Follow-up not needed.          Vitals Value Taken Time   /71 10/4/2019 10:10 AM   Temp 36 °C (96.8 °F) 10/4/2019  9:50 AM   Pulse 84 10/4/2019 10:10 AM   Resp 16 10/4/2019 10:10 AM   SpO2 96 % 10/4/2019 10:10 AM         No case tracking events are documented in the log.      Pain/Felicia Score: No data recorded

## 2019-10-23 ENCOUNTER — TELEPHONE (OUTPATIENT)
Dept: OBSTETRICS AND GYNECOLOGY | Facility: CLINIC | Age: 39
End: 2019-10-23

## 2019-10-23 ENCOUNTER — OFFICE VISIT (OUTPATIENT)
Dept: OBSTETRICS AND GYNECOLOGY | Facility: CLINIC | Age: 39
End: 2019-10-23
Payer: COMMERCIAL

## 2019-10-23 VITALS
HEIGHT: 62 IN | SYSTOLIC BLOOD PRESSURE: 122 MMHG | BODY MASS INDEX: 25.83 KG/M2 | WEIGHT: 140.38 LBS | HEART RATE: 74 BPM | DIASTOLIC BLOOD PRESSURE: 80 MMHG | RESPIRATION RATE: 13 BRPM

## 2019-10-23 DIAGNOSIS — Z09 POSTOPERATIVE EXAMINATION: Primary | ICD-10-CM

## 2019-10-23 DIAGNOSIS — N93.9 ABNORMAL UTERINE BLEEDING (AUB): ICD-10-CM

## 2019-10-23 PROCEDURE — 99999 PR PBB SHADOW E&M-EST. PATIENT-LVL III: CPT | Mod: PBBFAC,,, | Performed by: OBSTETRICS & GYNECOLOGY

## 2019-10-23 PROCEDURE — 99024 POSTOP FOLLOW-UP VISIT: CPT | Mod: S$GLB,,, | Performed by: OBSTETRICS & GYNECOLOGY

## 2019-10-23 PROCEDURE — 99999 PR PBB SHADOW E&M-EST. PATIENT-LVL III: ICD-10-PCS | Mod: PBBFAC,,, | Performed by: OBSTETRICS & GYNECOLOGY

## 2019-10-23 PROCEDURE — 99024 PR POST-OP FOLLOW-UP VISIT: ICD-10-PCS | Mod: S$GLB,,, | Performed by: OBSTETRICS & GYNECOLOGY

## 2019-10-23 NOTE — TELEPHONE ENCOUNTER
Per Dr Chavez:  I have changed the diagnosis to be Abnormal uterine bleeding (N93.9)   Please confirm that this is the appropriate diagnosis code.     It is updated in her op note and discharge summary   (It was already listed correctly in the H&P)     Message forwarded to Anita Arriaga.

## 2019-10-23 NOTE — PROGRESS NOTES
Obstetrics and Gynecology  Post-operative Progress Note    Chief Complaint   Patient presents with    Post-op Evaluation       Awilda Mckeon is a 39 y.o. female  post-op from a Hysteroscopy D&C with Novasure ablation on 10/4/19.  Patient is Doing well postoperatively.      The pathology revealed:  Specimen submitted as endometrial scrapings:  -Fragments of late secretory endometrium, post-ovulatory day 9 to 10      Past Medical History:   Diagnosis Date    Abnormal Pap smear of cervix years ago    cyro done, nl since    Arthritis     Menorrhagia 2019    Mitral valve prolapse     Pregnancy         Psoriasis      Past Surgical History:   Procedure Laterality Date    COLPOSCOPY      remote    ENDOMETRIAL ABLATION      RHINOPLASTY      TONSILLECTOMY      TYMPANOSTOMY TUBE PLACEMENT       Family History   Problem Relation Age of Onset    Hypertension Mother     Hyperlipidemia Father     No Known Problems Sister     Epilepsy Daughter     No Known Problems Son     No Known Problems Maternal Grandmother     No Known Problems Maternal Grandfather     No Known Problems Paternal Grandmother     No Known Problems Paternal Grandfather     Breast cancer Neg Hx     Colon cancer Neg Hx     Ovarian cancer Neg Hx      Social History     Tobacco Use    Smoking status: Current Every Day Smoker     Packs/day: 0.50     Years: 20.00     Pack years: 10.00     Types: Cigarettes    Smokeless tobacco: Never Used   Substance Use Topics    Alcohol use: Yes     Alcohol/week: 2.0 standard drinks     Types: 1 Cans of beer, 1 Shots of liquor per week     Frequency: 2-4 times a month     Drinks per session: 3 or 4     Binge frequency: Less than monthly     Comment: socially    Drug use: No     OB History    Para Term  AB Living   2 2 2         SAB TAB Ectopic Multiple Live Births                  # Outcome Date GA Lbr [place]/2nd Weight Sex Delivery Anes PTL Lv   2 Term            1 Term      "           Blood Pressure 122/80   Pulse 74   Respiration 13   Height 5' 2" (1.575 m)   Weight 63.7 kg (140 lb 6.4 oz)   Body Mass Index 25.68 kg/m²     ROS:  GENERAL: No fever, chills, fatigability or weight loss.  VULVAR: No pain, no lesions and no itching.  VAGINAL: No relaxation, no itching, no discharge, no abnormal bleeding and no lesions.  ABDOMEN: No abdominal pain. Denies nausea. Denies vomiting. No diarrhea. No constipation  BREAST: Denies pain. No lumps. No discharge.  URINARY: No incontinence, no nocturia, no frequency and no dysuria.  CARDIOVASCULAR: No chest pain. No shortness of breath. No leg cramps.  NEUROLOGICAL: No headaches. No vision changes.    Physical Exam   Constitutional: She is oriented to person, place, and time. She appears well-developed and well-nourished. No distress.   HENT:   Head: Normocephalic and atraumatic.   Eyes: Conjunctivae are normal.   Neck: Normal range of motion. Neck supple.   Pulmonary/Chest: Effort normal.   Abdominal: Soft. There is no tenderness. There is no rebound and no guarding.   Neurological: She is alert and oriented to person, place, and time.   Skin: Skin is warm and dry. No erythema. No pallor.   Psychiatric: She has a normal mood and affect. Her behavior is normal. Judgment and thought content normal.   Vitals reviewed.          ASSESSMENT:    1. Postoperative examination          PLAN:  Pathology reviewed, benign.   No restrictions.  Follow up as need and for annual exams.  "

## 2019-10-23 NOTE — TELEPHONE ENCOUNTER
Anita Arriaga with physician revenue dept states that [facility] is denying endometrial ablation done on 10/4/19 due to procedure being considered experimental with diagnosis code used (Dx code used N92.0). She states that an addendum showing medical necessity needs to be done in chart so they can get procedure denial overturned or complete an appeal. She states that she will check back in chart for addendum.

## 2019-12-30 PROBLEM — Z00.00 ROUTINE MEDICAL EXAM: Status: RESOLVED | Noted: 2019-09-25 | Resolved: 2019-12-30

## 2020-07-24 DIAGNOSIS — Z12.39 BREAST CANCER SCREENING: ICD-10-CM

## 2020-08-19 ENCOUNTER — OFFICE VISIT (OUTPATIENT)
Dept: OBSTETRICS AND GYNECOLOGY | Facility: CLINIC | Age: 40
End: 2020-08-19
Payer: COMMERCIAL

## 2020-08-19 VITALS
RESPIRATION RATE: 14 BRPM | WEIGHT: 146.19 LBS | DIASTOLIC BLOOD PRESSURE: 84 MMHG | SYSTOLIC BLOOD PRESSURE: 126 MMHG | BODY MASS INDEX: 26.9 KG/M2 | HEIGHT: 62 IN | HEART RATE: 88 BPM

## 2020-08-19 DIAGNOSIS — Z12.31 SCREENING MAMMOGRAM, ENCOUNTER FOR: ICD-10-CM

## 2020-08-19 DIAGNOSIS — Z01.419 ENCOUNTER FOR GYNECOLOGICAL EXAMINATION (GENERAL) (ROUTINE) WITHOUT ABNORMAL FINDINGS: Primary | ICD-10-CM

## 2020-08-19 PROCEDURE — 99396 PREV VISIT EST AGE 40-64: CPT | Mod: S$GLB,,, | Performed by: OBSTETRICS & GYNECOLOGY

## 2020-08-19 PROCEDURE — 99999 PR PBB SHADOW E&M-EST. PATIENT-LVL III: CPT | Mod: PBBFAC,,, | Performed by: OBSTETRICS & GYNECOLOGY

## 2020-08-19 PROCEDURE — 99396 PR PREVENTIVE VISIT,EST,40-64: ICD-10-PCS | Mod: S$GLB,,, | Performed by: OBSTETRICS & GYNECOLOGY

## 2020-08-19 PROCEDURE — 99999 PR PBB SHADOW E&M-EST. PATIENT-LVL III: ICD-10-PCS | Mod: PBBFAC,,, | Performed by: OBSTETRICS & GYNECOLOGY

## 2020-08-19 RX ORDER — NAPROXEN SODIUM 220 MG
220 TABLET ORAL DAILY
COMMUNITY
End: 2022-06-09

## 2020-08-19 RX ORDER — GARLIC 1000 MG
CAPSULE ORAL
COMMUNITY
End: 2021-05-31

## 2020-08-19 NOTE — PROGRESS NOTES
Subjective:    Patient ID: Awilda Mckeon is a 40 y.o. y.o. female.     Chief Complaint: Annual Well Woman Exam     History of Present Illness:  Awilda presents today for Annual Well Woman exam. She describes her menses as absent since endomtrial ablation.She denies pelvic pain.  She denies breast tenderness, masses, nipple discharge. She denies difficulty with urination or bowel movements. She denies menopausal symptoms such as hotflashes, vaginal dryness, and night sweats. She denies bloating, early satiety, or weight changes. She is sexually active. Contraception is by bilateral tubal ligation.      Menstrual History:   No LMP recorded. Patient has had an ablation..     OB History    : 2  Para: 2  Term: 2            The following portions of the patient's history were reviewed and updated as appropriate: allergies, current medications, past family history, past medical history, past social history, past surgical history and problem list.    ROS:   CONSTITUTIONAL: Negative for fever, chills, diaphoresis, weakness, fatigue, weight loss, weight gain  ENT: negative for sore throat, nasal congestion, nasal discharge, epistaxis, tinnitus, hearing loss  EYES: negative for blurry vision, decreased vision, loss of vision, eye pain, diplopia, photophobia, discharge  SKIN: Negative for rash, itching, hives  RESPIRATORY: negative for cough, hemoptysis, shortness of breath, pleuritic chest pain, wheezing  CARDIOVASCULAR: negative for chest pain, dyspnea on exertion, orthopnea, paroxysmal nocturnal dyspnea, edema, palpitations  BREAST: negative for breast  tenderness, breast mass, nipple discharge, or skin changes  GASTROINTESTINAL: negative for abdominal pain, flank pain, nausea, vomiting, diarrhea, constipation, black stool, blood in stool  GENITOURINARY: negative for abnormal vaginal bleeding, amenorrhea, decreased libido, dysuria, genital sores, hematuria, incontinence, menorrhagia, pelvic pain, urinary  frequency, vaginal discharge  HEMATOLOGIC/LYMPHATIC: negative for swollen lymph nodes, bleeding, bruising  MUSCULOSKELETAL: joint pain, joint swelling, Denies: back pain, muscle pain, muscle weakness  NEUROLOGICAL: negative for dizzy/vertigo, headache, focal weakness, numbness/tingling, speech problems, loss of consciousness, confusion, memory loss  BEHAVORIAL/PSYCH: negative for anxiety, depression, psychosis  ENDOCRINE: negative for polydipsia/polyuria, palpitations, skin changes, temperature intolerance, unexpected weight changes  ALLERGIC/IMMUNOLOGIC: negative for urticaria, hay fever, angioedema      Objective:    Vital Signs:  Vitals:    08/19/20 1124   BP: 126/84   Pulse: 88   Resp: 14       Physical Exam:  General:  alert, cooperative, no distress   Skin:  Skin color, texture, turgor normal. No rashes or lesions   HEENT:  extra ocular movement intact, sclera clear, anicteric   Neck: supple, trachea midline, no adenopathy or thyromegally   Respiratory:  Normal effort   Breasts:  no discharge, erythema, tenderness, or palpable masses; no axillary lymphadenopathy   Abdomen:  soft, nontender, no palpable masses   Pelvis: External genitalia: normal general appearance  Urinary system: urethral meatus normal, bladder nontender  Vaginal: normal mucosa without prolapse or lesions  Cervix: normal appearance  Uterus: normal size, shape, position  Adnexa: normal size, nontender bilaterally   Extremities: Normal ROM; no edema, no cyanosis   Neurologial: Normal strength and tone. No focal numbness or weakness.   Psychiatric: normal mood, speech, dress, and thought processes         Assessment:       Healthy female exam.     1. Encounter for gynecological examination (general) (routine) without abnormal findings    2. Screening mammogram, encounter for          Plan:      Encounter for gynecological examination (general) (routine) without abnormal findings    Screening mammogram, encounter for        Pap/HPV 2018  NEM    COUNSELING:  Awilda was counseled on A.C.O.G. Pap guidelines and recommendations for yearly pelvic exams in addition to recommendations for monthly self breast exams; to see her PCP for other health maintenance.

## 2020-10-05 ENCOUNTER — PATIENT MESSAGE (OUTPATIENT)
Dept: ADMINISTRATIVE | Facility: HOSPITAL | Age: 40
End: 2020-10-05

## 2021-01-04 ENCOUNTER — PATIENT MESSAGE (OUTPATIENT)
Dept: ADMINISTRATIVE | Facility: HOSPITAL | Age: 41
End: 2021-01-04

## 2021-04-01 ENCOUNTER — PATIENT MESSAGE (OUTPATIENT)
Dept: ADMINISTRATIVE | Facility: HOSPITAL | Age: 41
End: 2021-04-01

## 2021-04-12 ENCOUNTER — PATIENT MESSAGE (OUTPATIENT)
Dept: ADMINISTRATIVE | Facility: HOSPITAL | Age: 41
End: 2021-04-12

## 2021-04-30 ENCOUNTER — TELEPHONE (OUTPATIENT)
Dept: GASTROENTEROLOGY | Facility: CLINIC | Age: 41
End: 2021-04-30

## 2021-05-06 ENCOUNTER — PATIENT MESSAGE (OUTPATIENT)
Dept: RESEARCH | Facility: HOSPITAL | Age: 41
End: 2021-05-06

## 2021-05-10 ENCOUNTER — OFFICE VISIT (OUTPATIENT)
Dept: GASTROENTEROLOGY | Facility: CLINIC | Age: 41
End: 2021-05-10
Payer: COMMERCIAL

## 2021-05-10 VITALS
WEIGHT: 142.69 LBS | BODY MASS INDEX: 26.1 KG/M2 | HEART RATE: 77 BPM | DIASTOLIC BLOOD PRESSURE: 80 MMHG | SYSTOLIC BLOOD PRESSURE: 132 MMHG

## 2021-05-10 DIAGNOSIS — K59.04 CHRONIC IDIOPATHIC CONSTIPATION: ICD-10-CM

## 2021-05-10 DIAGNOSIS — R19.8 RECTAL PRESSURE: ICD-10-CM

## 2021-05-10 DIAGNOSIS — K62.5 RECTAL BLEEDING: Primary | ICD-10-CM

## 2021-05-10 PROCEDURE — 99204 PR OFFICE/OUTPT VISIT, NEW, LEVL IV, 45-59 MIN: ICD-10-PCS | Mod: S$GLB,,, | Performed by: INTERNAL MEDICINE

## 2021-05-10 PROCEDURE — 99999 PR PBB SHADOW E&M-EST. PATIENT-LVL III: ICD-10-PCS | Mod: PBBFAC,,, | Performed by: INTERNAL MEDICINE

## 2021-05-10 PROCEDURE — 3008F PR BODY MASS INDEX (BMI) DOCUMENTED: ICD-10-PCS | Mod: CPTII,S$GLB,, | Performed by: INTERNAL MEDICINE

## 2021-05-10 PROCEDURE — 99999 PR PBB SHADOW E&M-EST. PATIENT-LVL III: CPT | Mod: PBBFAC,,, | Performed by: INTERNAL MEDICINE

## 2021-05-10 PROCEDURE — 1126F PR PAIN SEVERITY QUANTIFIED, NO PAIN PRESENT: ICD-10-PCS | Mod: S$GLB,,, | Performed by: INTERNAL MEDICINE

## 2021-05-10 PROCEDURE — 99204 OFFICE O/P NEW MOD 45 MIN: CPT | Mod: S$GLB,,, | Performed by: INTERNAL MEDICINE

## 2021-05-10 PROCEDURE — 1126F AMNT PAIN NOTED NONE PRSNT: CPT | Mod: S$GLB,,, | Performed by: INTERNAL MEDICINE

## 2021-05-10 PROCEDURE — 3008F BODY MASS INDEX DOCD: CPT | Mod: CPTII,S$GLB,, | Performed by: INTERNAL MEDICINE

## 2021-05-10 RX ORDER — SODIUM PICOSULFATE, MAGNESIUM OXIDE, AND ANHYDROUS CITRIC ACID 10; 3.5; 12 MG/160ML; G/160ML; G/160ML
1 LIQUID ORAL ONCE
Qty: 320 ML | Refills: 0 | Status: SHIPPED | OUTPATIENT
Start: 2021-05-10 | End: 2021-05-10

## 2021-05-19 ENCOUNTER — PATIENT MESSAGE (OUTPATIENT)
Dept: GASTROENTEROLOGY | Facility: CLINIC | Age: 41
End: 2021-05-19

## 2021-07-01 ENCOUNTER — OFFICE VISIT (OUTPATIENT)
Dept: SURGERY | Facility: CLINIC | Age: 41
End: 2021-07-01
Payer: COMMERCIAL

## 2021-07-01 VITALS
SYSTOLIC BLOOD PRESSURE: 130 MMHG | BODY MASS INDEX: 24.65 KG/M2 | DIASTOLIC BLOOD PRESSURE: 100 MMHG | WEIGHT: 133.94 LBS | HEIGHT: 62 IN

## 2021-07-01 DIAGNOSIS — K62.5 RECTAL BLEEDING: ICD-10-CM

## 2021-07-01 DIAGNOSIS — R19.8 RECTAL PRESSURE: ICD-10-CM

## 2021-07-01 DIAGNOSIS — S31.831S: Primary | ICD-10-CM

## 2021-07-01 PROCEDURE — 99999 PR PBB SHADOW E&M-EST. PATIENT-LVL III: ICD-10-PCS | Mod: PBBFAC,,, | Performed by: COLON & RECTAL SURGERY

## 2021-07-01 PROCEDURE — 99999 PR PBB SHADOW E&M-EST. PATIENT-LVL III: CPT | Mod: PBBFAC,,, | Performed by: COLON & RECTAL SURGERY

## 2021-07-01 PROCEDURE — 46600 DIAGNOSTIC ANOSCOPY SPX: CPT | Mod: S$GLB,,, | Performed by: COLON & RECTAL SURGERY

## 2021-07-01 PROCEDURE — 1125F PR PAIN SEVERITY QUANTIFIED, PAIN PRESENT: ICD-10-PCS | Mod: S$GLB,,, | Performed by: COLON & RECTAL SURGERY

## 2021-07-01 PROCEDURE — 46600 PR DIAG2STIC A2SCOPY: ICD-10-PCS | Mod: S$GLB,,, | Performed by: COLON & RECTAL SURGERY

## 2021-07-01 PROCEDURE — 3008F PR BODY MASS INDEX (BMI) DOCUMENTED: ICD-10-PCS | Mod: CPTII,S$GLB,, | Performed by: COLON & RECTAL SURGERY

## 2021-07-01 PROCEDURE — 99204 OFFICE O/P NEW MOD 45 MIN: CPT | Mod: 25,S$GLB,, | Performed by: COLON & RECTAL SURGERY

## 2021-07-01 PROCEDURE — 3008F BODY MASS INDEX DOCD: CPT | Mod: CPTII,S$GLB,, | Performed by: COLON & RECTAL SURGERY

## 2021-07-01 PROCEDURE — 99204 PR OFFICE/OUTPT VISIT, NEW, LEVL IV, 45-59 MIN: ICD-10-PCS | Mod: 25,S$GLB,, | Performed by: COLON & RECTAL SURGERY

## 2021-07-01 PROCEDURE — 1125F AMNT PAIN NOTED PAIN PRSNT: CPT | Mod: S$GLB,,, | Performed by: COLON & RECTAL SURGERY

## 2021-07-06 ENCOUNTER — PATIENT MESSAGE (OUTPATIENT)
Dept: ADMINISTRATIVE | Facility: HOSPITAL | Age: 41
End: 2021-07-06

## 2021-07-07 ENCOUNTER — TELEPHONE (OUTPATIENT)
Dept: SURGERY | Facility: CLINIC | Age: 41
End: 2021-07-07

## 2021-07-08 ENCOUNTER — OFFICE VISIT (OUTPATIENT)
Dept: SURGERY | Facility: CLINIC | Age: 41
End: 2021-07-08
Payer: COMMERCIAL

## 2021-07-08 VITALS
BODY MASS INDEX: 25.1 KG/M2 | HEIGHT: 62 IN | DIASTOLIC BLOOD PRESSURE: 86 MMHG | WEIGHT: 136.38 LBS | SYSTOLIC BLOOD PRESSURE: 122 MMHG

## 2021-07-08 DIAGNOSIS — K64.1 GRADE II HEMORRHOIDS: Primary | ICD-10-CM

## 2021-07-08 PROCEDURE — 99213 OFFICE O/P EST LOW 20 MIN: CPT | Mod: 25,S$GLB,, | Performed by: COLON & RECTAL SURGERY

## 2021-07-08 PROCEDURE — 99999 PR PBB SHADOW E&M-EST. PATIENT-LVL III: CPT | Mod: PBBFAC,,, | Performed by: COLON & RECTAL SURGERY

## 2021-07-08 PROCEDURE — 1126F AMNT PAIN NOTED NONE PRSNT: CPT | Mod: S$GLB,,, | Performed by: COLON & RECTAL SURGERY

## 2021-07-08 PROCEDURE — 3008F BODY MASS INDEX DOCD: CPT | Mod: CPTII,S$GLB,, | Performed by: COLON & RECTAL SURGERY

## 2021-07-08 PROCEDURE — 3008F PR BODY MASS INDEX (BMI) DOCUMENTED: ICD-10-PCS | Mod: CPTII,S$GLB,, | Performed by: COLON & RECTAL SURGERY

## 2021-07-08 PROCEDURE — 46221 PR HEMORRHOIDECTOMY INTERNAL RUBBER BAND LIGATIONS: ICD-10-PCS | Mod: S$GLB,,, | Performed by: COLON & RECTAL SURGERY

## 2021-07-08 PROCEDURE — 1126F PR PAIN SEVERITY QUANTIFIED, NO PAIN PRESENT: ICD-10-PCS | Mod: S$GLB,,, | Performed by: COLON & RECTAL SURGERY

## 2021-07-08 PROCEDURE — 46221 LIGATION OF HEMORRHOID(S): CPT | Mod: S$GLB,,, | Performed by: COLON & RECTAL SURGERY

## 2021-07-08 PROCEDURE — 99213 PR OFFICE/OUTPT VISIT, EST, LEVL III, 20-29 MIN: ICD-10-PCS | Mod: 25,S$GLB,, | Performed by: COLON & RECTAL SURGERY

## 2021-07-08 PROCEDURE — 99999 PR PBB SHADOW E&M-EST. PATIENT-LVL III: ICD-10-PCS | Mod: PBBFAC,,, | Performed by: COLON & RECTAL SURGERY

## 2021-07-19 PROBLEM — M62.89 PELVIC FLOOR TENSION: Status: ACTIVE | Noted: 2021-07-19

## 2021-07-19 PROBLEM — N39.3 STRESS INCONTINENCE: Status: ACTIVE | Noted: 2021-07-19

## 2021-07-19 PROBLEM — N81.89 PELVIC FLOOR WEAKNESS: Status: ACTIVE | Noted: 2021-07-19

## 2021-10-04 ENCOUNTER — PATIENT MESSAGE (OUTPATIENT)
Dept: ADMINISTRATIVE | Facility: HOSPITAL | Age: 41
End: 2021-10-04

## 2021-10-13 ENCOUNTER — OFFICE VISIT (OUTPATIENT)
Dept: OBSTETRICS AND GYNECOLOGY | Facility: CLINIC | Age: 41
End: 2021-10-13
Payer: COMMERCIAL

## 2021-10-13 VITALS
HEART RATE: 86 BPM | RESPIRATION RATE: 13 BRPM | HEIGHT: 62 IN | BODY MASS INDEX: 25.12 KG/M2 | SYSTOLIC BLOOD PRESSURE: 118 MMHG | DIASTOLIC BLOOD PRESSURE: 72 MMHG | WEIGHT: 136.5 LBS

## 2021-10-13 DIAGNOSIS — N83.202 LEFT OVARIAN CYST: Primary | ICD-10-CM

## 2021-10-13 DIAGNOSIS — Z12.31 SCREENING MAMMOGRAM FOR BREAST CANCER: ICD-10-CM

## 2021-10-13 PROCEDURE — 3078F PR MOST RECENT DIASTOLIC BLOOD PRESSURE < 80 MM HG: ICD-10-PCS | Mod: CPTII,S$GLB,, | Performed by: OBSTETRICS & GYNECOLOGY

## 2021-10-13 PROCEDURE — 3008F PR BODY MASS INDEX (BMI) DOCUMENTED: ICD-10-PCS | Mod: CPTII,S$GLB,, | Performed by: OBSTETRICS & GYNECOLOGY

## 2021-10-13 PROCEDURE — 1159F MED LIST DOCD IN RCRD: CPT | Mod: CPTII,S$GLB,, | Performed by: OBSTETRICS & GYNECOLOGY

## 2021-10-13 PROCEDURE — 99213 PR OFFICE/OUTPT VISIT, EST, LEVL III, 20-29 MIN: ICD-10-PCS | Mod: S$GLB,,, | Performed by: OBSTETRICS & GYNECOLOGY

## 2021-10-13 PROCEDURE — 99213 OFFICE O/P EST LOW 20 MIN: CPT | Mod: S$GLB,,, | Performed by: OBSTETRICS & GYNECOLOGY

## 2021-10-13 PROCEDURE — 3008F BODY MASS INDEX DOCD: CPT | Mod: CPTII,S$GLB,, | Performed by: OBSTETRICS & GYNECOLOGY

## 2021-10-13 PROCEDURE — 3078F DIAST BP <80 MM HG: CPT | Mod: CPTII,S$GLB,, | Performed by: OBSTETRICS & GYNECOLOGY

## 2021-10-13 PROCEDURE — 1159F PR MEDICATION LIST DOCUMENTED IN MEDICAL RECORD: ICD-10-PCS | Mod: CPTII,S$GLB,, | Performed by: OBSTETRICS & GYNECOLOGY

## 2021-10-13 PROCEDURE — 3074F SYST BP LT 130 MM HG: CPT | Mod: CPTII,S$GLB,, | Performed by: OBSTETRICS & GYNECOLOGY

## 2021-10-13 PROCEDURE — 1160F PR REVIEW ALL MEDS BY PRESCRIBER/CLIN PHARMACIST DOCUMENTED: ICD-10-PCS | Mod: CPTII,S$GLB,, | Performed by: OBSTETRICS & GYNECOLOGY

## 2021-10-13 PROCEDURE — 3074F PR MOST RECENT SYSTOLIC BLOOD PRESSURE < 130 MM HG: ICD-10-PCS | Mod: CPTII,S$GLB,, | Performed by: OBSTETRICS & GYNECOLOGY

## 2021-10-13 PROCEDURE — 99999 PR PBB SHADOW E&M-EST. PATIENT-LVL IV: ICD-10-PCS | Mod: PBBFAC,,, | Performed by: OBSTETRICS & GYNECOLOGY

## 2021-10-13 PROCEDURE — 99999 PR PBB SHADOW E&M-EST. PATIENT-LVL IV: CPT | Mod: PBBFAC,,, | Performed by: OBSTETRICS & GYNECOLOGY

## 2021-10-13 PROCEDURE — 1160F RVW MEDS BY RX/DR IN RCRD: CPT | Mod: CPTII,S$GLB,, | Performed by: OBSTETRICS & GYNECOLOGY

## 2022-01-10 ENCOUNTER — PATIENT MESSAGE (OUTPATIENT)
Dept: ADMINISTRATIVE | Facility: HOSPITAL | Age: 42
End: 2022-01-10
Payer: COMMERCIAL

## 2022-06-09 ENCOUNTER — OFFICE VISIT (OUTPATIENT)
Dept: FAMILY MEDICINE | Facility: CLINIC | Age: 42
End: 2022-06-09
Payer: COMMERCIAL

## 2022-06-09 VITALS
WEIGHT: 138.63 LBS | HEIGHT: 62 IN | OXYGEN SATURATION: 98 % | DIASTOLIC BLOOD PRESSURE: 64 MMHG | HEART RATE: 88 BPM | BODY MASS INDEX: 25.51 KG/M2 | SYSTOLIC BLOOD PRESSURE: 118 MMHG

## 2022-06-09 DIAGNOSIS — R60.0 LEG EDEMA, LEFT: ICD-10-CM

## 2022-06-09 DIAGNOSIS — Z11.59 NEED FOR HEPATITIS C SCREENING TEST: ICD-10-CM

## 2022-06-09 DIAGNOSIS — Z00.01 ENCOUNTER FOR GENERAL ADULT MEDICAL EXAMINATION WITH ABNORMAL FINDINGS: Primary | ICD-10-CM

## 2022-06-09 DIAGNOSIS — N83.202 CYST OF LEFT OVARY: ICD-10-CM

## 2022-06-09 DIAGNOSIS — L40.50 PSORIATIC ARTHRITIS: ICD-10-CM

## 2022-06-09 DIAGNOSIS — F17.201 TOBACCO DEPENDENCE IN REMISSION: ICD-10-CM

## 2022-06-09 DIAGNOSIS — D25.1 INTRAMURAL LEIOMYOMA OF UTERUS: ICD-10-CM

## 2022-06-09 DIAGNOSIS — Z11.4 SCREENING FOR HIV (HUMAN IMMUNODEFICIENCY VIRUS): ICD-10-CM

## 2022-06-09 PROBLEM — N81.89 PELVIC FLOOR WEAKNESS: Status: RESOLVED | Noted: 2021-07-19 | Resolved: 2022-06-09

## 2022-06-09 PROBLEM — K62.5 RECTAL BLEEDING: Status: RESOLVED | Noted: 2021-05-10 | Resolved: 2022-06-09

## 2022-06-09 PROBLEM — R19.8 RECTAL PRESSURE: Status: RESOLVED | Noted: 2021-05-10 | Resolved: 2022-06-09

## 2022-06-09 PROBLEM — N92.0 MENORRHAGIA WITH REGULAR CYCLE: Status: RESOLVED | Noted: 2019-09-25 | Resolved: 2022-06-09

## 2022-06-09 PROBLEM — N93.9 ABNORMAL UTERINE BLEEDING (AUB): Status: RESOLVED | Noted: 2019-10-23 | Resolved: 2022-06-09

## 2022-06-09 PROBLEM — M62.89 PELVIC FLOOR TENSION: Status: RESOLVED | Noted: 2021-07-19 | Resolved: 2022-06-09

## 2022-06-09 PROBLEM — N39.3 STRESS INCONTINENCE: Status: RESOLVED | Noted: 2021-07-19 | Resolved: 2022-06-09

## 2022-06-09 PROCEDURE — 3074F PR MOST RECENT SYSTOLIC BLOOD PRESSURE < 130 MM HG: ICD-10-PCS | Mod: CPTII,S$GLB,, | Performed by: FAMILY MEDICINE

## 2022-06-09 PROCEDURE — 99999 PR PBB SHADOW E&M-EST. PATIENT-LVL IV: CPT | Mod: PBBFAC,,, | Performed by: FAMILY MEDICINE

## 2022-06-09 PROCEDURE — 1160F PR REVIEW ALL MEDS BY PRESCRIBER/CLIN PHARMACIST DOCUMENTED: ICD-10-PCS | Mod: CPTII,S$GLB,, | Performed by: FAMILY MEDICINE

## 2022-06-09 PROCEDURE — 3078F DIAST BP <80 MM HG: CPT | Mod: CPTII,S$GLB,, | Performed by: FAMILY MEDICINE

## 2022-06-09 PROCEDURE — 3074F SYST BP LT 130 MM HG: CPT | Mod: CPTII,S$GLB,, | Performed by: FAMILY MEDICINE

## 2022-06-09 PROCEDURE — 3008F PR BODY MASS INDEX (BMI) DOCUMENTED: ICD-10-PCS | Mod: CPTII,S$GLB,, | Performed by: FAMILY MEDICINE

## 2022-06-09 PROCEDURE — 99999 PR PBB SHADOW E&M-EST. PATIENT-LVL IV: ICD-10-PCS | Mod: PBBFAC,,, | Performed by: FAMILY MEDICINE

## 2022-06-09 PROCEDURE — 3008F BODY MASS INDEX DOCD: CPT | Mod: CPTII,S$GLB,, | Performed by: FAMILY MEDICINE

## 2022-06-09 PROCEDURE — 3078F PR MOST RECENT DIASTOLIC BLOOD PRESSURE < 80 MM HG: ICD-10-PCS | Mod: CPTII,S$GLB,, | Performed by: FAMILY MEDICINE

## 2022-06-09 PROCEDURE — 1160F RVW MEDS BY RX/DR IN RCRD: CPT | Mod: CPTII,S$GLB,, | Performed by: FAMILY MEDICINE

## 2022-06-09 PROCEDURE — 99396 PR PREVENTIVE VISIT,EST,40-64: ICD-10-PCS | Mod: S$GLB,,, | Performed by: FAMILY MEDICINE

## 2022-06-09 PROCEDURE — 1159F PR MEDICATION LIST DOCUMENTED IN MEDICAL RECORD: ICD-10-PCS | Mod: CPTII,S$GLB,, | Performed by: FAMILY MEDICINE

## 2022-06-09 PROCEDURE — 99396 PREV VISIT EST AGE 40-64: CPT | Mod: S$GLB,,, | Performed by: FAMILY MEDICINE

## 2022-06-09 PROCEDURE — 1159F MED LIST DOCD IN RCRD: CPT | Mod: CPTII,S$GLB,, | Performed by: FAMILY MEDICINE

## 2022-06-09 RX ORDER — MELOXICAM 15 MG/1
15 TABLET ORAL DAILY
Qty: 90 TABLET | Refills: 3 | Status: SHIPPED | OUTPATIENT
Start: 2022-06-09 | End: 2023-10-20

## 2022-06-09 NOTE — PROGRESS NOTES
FAMILY MEDICINE  [facility]    Reason for visit:   Chief Complaint   Patient presents with    Annual Exam     Last around     Joint Pain    Hair Loss    Leg Swelling       HPI: Awilda Mckeon is a 41 y.o. female  -  smoker (20 yo 1/2 ppd quit 2022) with history of menorrhagia s/p endometrial ablation 10/6/19, uterine fibroid, psoratic arthritis and history of acute diverticulitis (10/2021) presents for her routine annual wellness however she does have several concerns    Gynecology: Dr. Chavez    The patient's last visit with me was on 2019. 10/2021 she was diagnosed with an acute diverticulitis that resolved with medication.  She had an up-to-date colonoscopy at that time.  Incidentally on her CT abdomen pelvis for her diverticulitis is noted that she had a left ovarian cyst.  She was evaluated by her gynecologist.  She had a low 1.6 cm hemorrhagic cyst that was benign and likely ruptured per gynecology gynecology did not recommend further follow-up.  She reports that she has been doing well since she has not had any recurrent pains.  She reports that after her initial bout of acute diverticulitis she would have some flare however since she quit smoking 2022 she has not had any difficulties with any abdominal pain    She does note that she has been under some increased stressors recently.  She is recently  from her .  Her father  of COVID-19.  She has noticed that she has been having increasingly thinning hair.  She does have a history of psoriatic arthritis.  She reports typically her rashes or only in her genital area.  She has not noticed any patches on her head.  She has had a history was she had patches on her elbows and face however that has since resolved.  She reports recently she has been dealing with more joint pain with her psoriatic arthritis which includes her hands, feet and knees.  She has not seen a rheumatologist in quite  some time.  She used to follow with dermatology for her rashes but since it has been under control she has not been following with Dermatology.  She notices swelling in her joints of her fingers and toes.    She reports about 2 weeks ago she was on the beach and had sudden swelling of her left lower leg distal to her knee.  She has not had a history of swelling in the past.  She does have varicose veins bilaterally.  She reports of few days after the swelling started she noticed a bruise on her left medial.  She notes that once the bruise started the swelling seemed to improve.  She does still has some mild pitting edema and she still has increased swelling of her left leg greater than right but reports that overall improved.  She denies any pain.  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  She does not wear compression stockings.    She reports that despite her increased stressors she feels that her sponsor normal.  She denies any depression or anxiety.  She reports that she still gets tearful at times when thinking about her father.  She is overall relieved that she  from her .  She reports that she felt that she cannot breathe in her house and feels more free at this time however she did lose several close friends.  She reports that she is more stressed when her son was not doing well however she reports that he seems to be adjusting well now.          Review of Systems   HENT: Negative for hearing loss.    Eyes: Negative for discharge.   Respiratory: Negative for wheezing.    Cardiovascular: Negative for chest pain and palpitations.   Gastrointestinal: Negative for blood in stool, constipation, diarrhea and vomiting.   Genitourinary: Negative for dysuria and hematuria.   Musculoskeletal: Negative for neck pain.   Neurological: Negative for weakness and headaches.   Endo/Heme/Allergies: Negative for polydipsia.   All other systems reviewed and are negative.      HISTORY:    Past Medical History:   Diagnosis Date    Abnormal Pap smear of cervix years ago    cyro done, nl since    Abnormal uterine bleeding (AUB) 10/23/2019    Arthritis     Menorrhagia 2019    Menorrhagia with regular cycle 2019    - followed by Gynecology and plan for endometrial ablation    Mitral valve prolapse     Pregnancy         Psoriasis        Past Surgical History:   Procedure Laterality Date    COLONOSCOPY N/A 6/3/2021    Procedure: COLONOSCOPY;  Surgeon: Alexa Palacios MD;  Location: [facility];  Service: Endoscopy;  Laterality: N/A;    COLPOSCOPY      remote    DILATION AND CURETTAGE OF UTERUS N/A 10/4/2019    Procedure: DILATION AND CURETTAGE, UTERUS;  Surgeon: Karlene Chavez MD;  Location: [facility];  Service: OB/GYN;  Laterality: N/A;    ENDOMETRIAL ABLATION      RHINOPLASTY      THERMAL ABLATION OF ENDOMETRIUM USING HYSTEROSCOPY N/A 10/4/2019    Procedure: ABLATION, ENDOMETRIUM, THERMAL, HYSTEROSCOPIC;  Surgeon: Karlene Chavez MD;  Location: [facility];  Service: OB/GYN;  Laterality: N/A;    TONSILLECTOMY      TYMPANOSTOMY TUBE PLACEMENT         Family History   Problem Relation Age of Onset    Hypertension Mother     Hyperlipidemia Father     No Known Problems Sister     Epilepsy Daughter     No Known Problems Son     No Known Problems Maternal Grandmother     No Known Problems Maternal Grandfather     No Known Problems Paternal Grandmother     No Known Problems Paternal Grandfather     Breast cancer Neg Hx     Colon cancer Neg Hx     Ovarian cancer Neg Hx        Social History     Tobacco Use    Smoking status: Former Smoker     Packs/day: 0.50     Years: 20.00     Pack years: 10.00     Types: Cigarettes     Quit date: 2022     Years since quittin.2    Smokeless tobacco: Never Used   Substance Use Topics    Alcohol use: Yes     Alcohol/week: 2.0 standard drinks     Types: 1 Cans of beer, 1 Shots of liquor per week     Comment: socially     "Drug use: No       Social History     Social History Narrative    Seperated 2022 and living with her mother. Works at dental office. 1 son (2022 15 yo) and 1 daughter.        ALLERGIES:   Review of patient's allergies indicates:  No Known Allergies    MEDS:     Current Outpatient Medications:     Lactobac no.41/Bifidobact no.7 (PROBIOTIC-10 ORAL), Take 1 tablet by mouth once daily at 6am., Disp: , Rfl:     linaCLOtide (LINZESS) 72 mcg Cap capsule, Take 1 capsule (72 mcg total) by mouth once daily., Disp: 90 capsule, Rfl: 3    meloxicam (MOBIC) 15 MG tablet, Take 1 tablet (15 mg total) by mouth once daily., Disp: 90 tablet, Rfl: 3    Vital signs:   Vitals:    06/09/22 1336   BP: 118/64   BP Location: Left arm   Patient Position: Sitting   BP Method: Medium (Manual)   Pulse: 88   SpO2: 98%   Weight: 62.9 kg (138 lb 9.6 oz)   Height: 5' 2" (1.575 m)     Body mass index is 25.35 kg/m².    PHYSICAL EXAM:     Physical Exam  Vitals reviewed.   Constitutional:       General: She is not in acute distress.  HENT:      Head: Normocephalic and atraumatic.      Right Ear: Tympanic membrane and ear canal normal.      Left Ear: Tympanic membrane and ear canal normal.   Eyes:      Pupils: Pupils are equal, round, and reactive to light.   Cardiovascular:      Rate and Rhythm: Normal rate and regular rhythm.      Pulses: Normal pulses.      Heart sounds: Normal heart sounds. No murmur heard.    No friction rub. No gallop.   Pulmonary:      Effort: Pulmonary effort is normal.      Breath sounds: Normal breath sounds. No wheezing, rhonchi or rales.   Abdominal:      General: Bowel sounds are normal. There is no distension.      Palpations: Abdomen is soft.      Tenderness: There is no abdominal tenderness.   Musculoskeletal:      Cervical back: Normal range of motion and neck supple.      Right lower leg: No edema.      Left lower leg: Edema (trace pitting ) present.   Skin:     General: Skin is warm.      Capillary Refill: " Capillary refill takes less than 2 seconds.             Comments: +bilateral varicose veins that are not bulging or tender   Neurological:      Mental Status: She is alert.           PHQ4 = No data recorded    PERTINENT RESULTS:   No visits with results within 1 Week(s) from this visit.   Latest known visit with results is:   Admission on 10/08/2021, Discharged on 10/08/2021   Component Date Value Ref Range Status    WBC 10/08/2021 12.67  3.90 - 12.70 K/uL Final    RBC 10/08/2021 4.91  4.00 - 5.40 M/uL Final    Hemoglobin 10/08/2021 14.1  12.0 - 16.0 g/dL Final    Hematocrit 10/08/2021 42.3  37.0 - 48.5 % Final    MCV 10/08/2021 86  82 - 98 fL Final    MCH 10/08/2021 28.7  27.0 - 31.0 pg Final    MCHC 10/08/2021 33.3  32.0 - 36.0 g/dL Final    RDW 10/08/2021 15.2 (A) 11.5 - 14.5 % Final    Platelets 10/08/2021 269  150 - 450 K/uL Final    MPV 10/08/2021 9.5  9.2 - 12.9 fL Final    Immature Granulocytes 10/08/2021 0.5  0.0 - 0.5 % Final    Gran # (ANC) 10/08/2021 8.4 (A) 1.8 - 7.7 K/uL Final    Immature Grans (Abs) 10/08/2021 0.06 (A) 0.00 - 0.04 K/uL Final    Comment: Mild elevation in immature granulocytes is non specific and   can be seen in a variety of conditions including stress response,   acute inflammation, trauma and pregnancy. Correlation with other   laboratory and clinical findings is essential.      Lymph # 10/08/2021 1.9  1.0 - 4.8 K/uL Final    Mono # 10/08/2021 1.1 (A) 0.3 - 1.0 K/uL Final    Eos # 10/08/2021 1.2 (A) 0.0 - 0.5 K/uL Final    Baso # 10/08/2021 0.05  0.00 - 0.20 K/uL Final    nRBC 10/08/2021 0  0 /100 WBC Final    Gran % 10/08/2021 66.4  38.0 - 73.0 % Final    Lymph % 10/08/2021 14.9 (A) 18.0 - 48.0 % Final    Mono % 10/08/2021 8.6  4.0 - 15.0 % Final    Eosinophil % 10/08/2021 9.2 (A) 0.0 - 8.0 % Final    Basophil % 10/08/2021 0.4  0.0 - 1.9 % Final    Differential Method 10/08/2021 Automated   Final    Sodium 10/08/2021 138  136 - 145 mmol/L Final     Potassium 10/08/2021 3.6  3.5 - 5.1 mmol/L Final    Chloride 10/08/2021 104  95 - 110 mmol/L Final    CO2 10/08/2021 28  23 - 29 mmol/L Final    Glucose 10/08/2021 90  70 - 110 mg/dL Final    BUN 10/08/2021 12  7 - 17 mg/dL Final    Creatinine 10/08/2021 0.55  0.50 - 1.40 mg/dL Final    Calcium 10/08/2021 9.6  8.7 - 10.5 mg/dL Final    Total Protein 10/08/2021 7.3  6.0 - 8.4 g/dL Final    Albumin 10/08/2021 4.1  3.5 - 5.2 g/dL Final    Total Bilirubin 10/08/2021 0.7  0.1 - 1.0 mg/dL Final    Comment: For infants and newborns, interpretation of results should be based  on gestational age, weight and in agreement with clinical  observations.    Premature Infant recommended reference ranges:  Up to 24 hours.............<8.0 mg/dL  Up to 48 hours............<12.0 mg/dL  3-5 days..................<15.0 mg/dL  6-29 days.................<15.0 mg/dL      Alkaline Phosphatase 10/08/2021 121  38 - 126 U/L Final    AST 10/08/2021 19  15 - 46 U/L Final    ALT 10/08/2021 12  10 - 44 U/L Final    Anion Gap 10/08/2021 6 (A) 8 - 16 mmol/L Final    eGFR if African American 10/08/2021 >60.0  >60 mL/min/1.73 m^2 Final    eGFR if non African American 10/08/2021 >60.0  >60 mL/min/1.73 m^2 Final    Comment: Calculation used to obtain the estimated glomerular filtration  rate (eGFR) is the CKD-EPI equation.       Specimen UA 10/08/2021 Urine, Clean Catch   Final    Color, UA 10/08/2021 Juliana  Yellow, Straw, Juliana Final    Appearance, UA 10/08/2021 Hazy (A) Clear Final    pH, UA 10/08/2021 7.0  5.0 - 8.0 Final    Specific [place], UA 10/08/2021 1.020  1.005 - 1.030 Final    Protein, UA 10/08/2021 Negative  Negative Final    Comment: Recommend a 24 hour urine protein or a urine   protein/creatinine ratio if globulin induced proteinuria is  clinically suspected.      Glucose, UA 10/08/2021 Negative  Negative Final    Ketones, UA 10/08/2021 Trace (A) Negative Final    Bilirubin (UA) 10/08/2021 Negative  Negative Final     Occult Blood UA 10/08/2021 Negative  Negative Final    Nitrite, UA 10/08/2021 Negative  Negative Final    Urobilinogen, UA 10/08/2021 4.0-6.0 (A) <2.0 EU/dL Final    Leukocytes, UA 10/08/2021 Negative  Negative Final    Preg Test, Ur 10/08/2021 Negative   Final     10/8/2021 STAT     Narrative & Impression  EXAMINATION:  CT ABDOMEN PELVIS WITH CONTRAST     CLINICAL HISTORY:  LLQ abdominal pain, diverticulitis suspected;     TECHNIQUE:  Low dose axial images, sagittal and coronal reformations were obtained from the lung bases to the pubic symphysis following the IV administration of 75 mL of Omnipaque 350     FINDINGS:  The visualized portion of the base of the lungs is significant for bilateral dependent atelectatic versus fibrotic change.  The visualized portion of the heart, stomach, spleen, pancreas, liver, gallbladder, adrenal glands, visualized portion of the aorta, kidneys, and bladder are unremarkable.  The uterus has a normal contour.  The bowel is significant for diverticulosis coli.  There is extensive abnormal mural thickening of the sigmoid colon with surrounding inflammation consistent with acute diverticulitis.  There is a 2.2 cm hypodensity adjacent to the sigmoid colon which is favored to be ovarian in origin.  However, cannot exclude a small abscess.  The osseous structures are unremarkable.     Impression:     Findings consistent with acute diverticulitis of the sigmoid colon.     2.2 cm hypodensity adjacent to the sigmoid colon in the expected location of the left ovary favored to be ovarian in origin cannot exclude a small abscess.        Electronically signed by: Shahriar Grajeda MD  Date:                                            10/08/2021  Time:                                           12:16  10/28/2021 Routine     Narrative & Impression  EXAMINATION:  US PELVIS COMP WITH TRANSVAG NON-OB (XPD)     CLINICAL HISTORY:  Unspecified ovarian cyst, left side     TECHNIQUE:  Transabdominal  "sonography of the pelvis was performed, followed by transvaginal sonography to better evaluate the uterus and ovaries.     COMPARISON:  06/26/2019.     FINDINGS:  Uterus:     Size: 7 3.9 x 4.7 cm     Masses: There is a single intramural uterine fibroid identified measuring 0.9 x 1.0 x 0.9 cm.     Endometrium: Measures 5.8 mm in thickness.  Note that the patient has a history of endometrial ablation.     Right ovary:     Size: 2.4 x 1.8 x 1.8 cm     Appearance: Normal     Vascular flow: Normal.     Left ovary:     Size: 2.9 x 2.1 x 2.6 cm     Appearance: There is a left ovarian cyst measuring 1.6 x 1.1 x 1.6 cm with a fine reticular pattern of internal echoes consistent with a small hemorrhagic cyst.  No additional follow-up is indicated.     Vascular Flow: Normal.     Free Fluid:     There is a trace amount of free fluid adjacent to the left ovary which can be a normal finding.     Impression:     Single intramural uterine fibroid measuring 1.0 cm.     1.6 cm left-sided hemorrhagic ovarian cyst.        Electronically signed by: Shahriar Carney MD  Date:                                            10/28/2021  Time:                                           15:24       Last Colonoscopy:  06/03/2021  Impression:            - Redundant colon which is a result of chronic                          constipation.                          - Internal hemorrhoids.                          - Perianal skin tags found on perianal exam which                          accounts for the "extra" tissue she feels.                          - Diverticulosis in the sigmoid colon.                          - No specimens collected.                          - Nothing found to account for symptoms, I am                          suspicious for rectal prolapse.     ASSESSMENT/PLAN:  Problem List Items Addressed This Visit        Renal/    Cyst of left ovary    Overview     - 10/28/21 Pelvis US: 1.6 cm left-sided hemorrhagic ovarian cyst. Trace " free fluid  - evaluated by Gynecology and benign and did not recommend further follow-up           Intramural leiomyoma of uterus    Overview     - 10/28/2021 pelvic ultrasound:  Single intramural uterine fibroid measuring 1 cm  - followed by Gynecology  - asymptomatic  - s/p ablation                 Orthopedic    Psoriatic arthritis    Overview     - increased arthralgias  - rash stable   - recommend Meloxicam 15 mg daily  - recommend evaluated by Rheumatology  - counseling regarding new medication including expected results, potential side effects, and appropriate use.  - questions elicited and answered  - encouraged to patient to notify me of any questions or concerns             Relevant Medications    meloxicam (MOBIC) 15 MG tablet    Other Relevant Orders    Ambulatory referral/consult to Rheumatology       Other    Encounter for general adult medical examination with abnormal findings - Primary    Overview     - preventative health counseling  - counseling on current recommendations for breast cancer screening. Up to date  - counseling on current recommendations for cervical cancer screening. Followed by Gynecology  - counseling on current recommendations for colon cancer screening. Average risk and recommend start 45-49 yo  - Vaccines recommended at this visit: see below             Relevant Orders    Comprehensive Metabolic Panel    CBC Without Differential    Lipid Panel    Hepatitis C Antibody    HIV 1/2 Ag/Ab (4th Gen)    TSH    Leg edema, left    Overview     - likely 2/2 venous insufficiency  - recommend LE US if negative, recommend compression hose knee high           Relevant Orders    US Lower Extremity Veins Left    Tobacco dependence in remission    Overview     - quit 3/2022             Other Visit Diagnoses     Screening for HIV (human immunodeficiency virus)        Relevant Orders    HIV 1/2 Ag/Ab (4th Gen)    Need for hepatitis C screening test        Relevant Orders    Hepatitis C Antibody           ORDERS:   Orders Placed This Encounter    US Lower Extremity Veins Left    Comprehensive Metabolic Panel    CBC Without Differential    Lipid Panel    Hepatitis C Antibody    HIV 1/2 Ag/Ab (4th Gen)    TSH    Ambulatory referral/consult to Rheumatology    meloxicam (MOBIC) 15 MG tablet       Vaccines recommended:  COVID-19    Follow-up in 1 year or sooner if any concerns.    This note is dictated using the M*Modal Fluency Direct word recognition program. There are word recognition mistakes that are occasionally missed on review.    Dr. Jenn Briones D.O.   Family Medicine

## 2022-06-13 ENCOUNTER — PATIENT MESSAGE (OUTPATIENT)
Dept: FAMILY MEDICINE | Facility: CLINIC | Age: 42
End: 2022-06-13
Payer: COMMERCIAL

## 2022-06-17 ENCOUNTER — PATIENT MESSAGE (OUTPATIENT)
Dept: FAMILY MEDICINE | Facility: CLINIC | Age: 42
End: 2022-06-17
Payer: COMMERCIAL

## 2022-06-17 DIAGNOSIS — T79.2XXA TRAUMATIC SEROMA OF LEFT LOWER LEG, INITIAL ENCOUNTER: Primary | ICD-10-CM

## 2022-06-17 NOTE — PROGRESS NOTES
Orders Placed This Encounter    Ambulatory referral/consult to General Surgery     Dr. Jenn Briones D.O.   [facility]

## 2022-07-14 ENCOUNTER — OFFICE VISIT (OUTPATIENT)
Dept: SURGERY | Facility: CLINIC | Age: 42
End: 2022-07-14
Payer: COMMERCIAL

## 2022-07-14 VITALS
HEIGHT: 62 IN | WEIGHT: 136 LBS | HEART RATE: 83 BPM | DIASTOLIC BLOOD PRESSURE: 81 MMHG | BODY MASS INDEX: 25.03 KG/M2 | SYSTOLIC BLOOD PRESSURE: 116 MMHG

## 2022-07-14 DIAGNOSIS — T79.2XXA TRAUMATIC SEROMA OF LEFT LOWER LEG, INITIAL ENCOUNTER: ICD-10-CM

## 2022-07-14 PROCEDURE — 1159F MED LIST DOCD IN RCRD: CPT | Mod: CPTII,S$GLB,, | Performed by: SURGERY

## 2022-07-14 PROCEDURE — 99999 PR PBB SHADOW E&M-EST. PATIENT-LVL III: CPT | Mod: PBBFAC,,, | Performed by: SURGERY

## 2022-07-14 PROCEDURE — 3008F BODY MASS INDEX DOCD: CPT | Mod: CPTII,S$GLB,, | Performed by: SURGERY

## 2022-07-14 PROCEDURE — 1160F PR REVIEW ALL MEDS BY PRESCRIBER/CLIN PHARMACIST DOCUMENTED: ICD-10-PCS | Mod: CPTII,S$GLB,, | Performed by: SURGERY

## 2022-07-14 PROCEDURE — 3074F PR MOST RECENT SYSTOLIC BLOOD PRESSURE < 130 MM HG: ICD-10-PCS | Mod: CPTII,S$GLB,, | Performed by: SURGERY

## 2022-07-14 PROCEDURE — 3074F SYST BP LT 130 MM HG: CPT | Mod: CPTII,S$GLB,, | Performed by: SURGERY

## 2022-07-14 PROCEDURE — 3079F DIAST BP 80-89 MM HG: CPT | Mod: CPTII,S$GLB,, | Performed by: SURGERY

## 2022-07-14 PROCEDURE — 1159F PR MEDICATION LIST DOCUMENTED IN MEDICAL RECORD: ICD-10-PCS | Mod: CPTII,S$GLB,, | Performed by: SURGERY

## 2022-07-14 PROCEDURE — 99203 PR OFFICE/OUTPT VISIT, NEW, LEVL III, 30-44 MIN: ICD-10-PCS | Mod: S$GLB,,, | Performed by: SURGERY

## 2022-07-14 PROCEDURE — 99999 PR PBB SHADOW E&M-EST. PATIENT-LVL III: ICD-10-PCS | Mod: PBBFAC,,, | Performed by: SURGERY

## 2022-07-14 PROCEDURE — 99203 OFFICE O/P NEW LOW 30 MIN: CPT | Mod: S$GLB,,, | Performed by: SURGERY

## 2022-07-14 PROCEDURE — 1160F RVW MEDS BY RX/DR IN RCRD: CPT | Mod: CPTII,S$GLB,, | Performed by: SURGERY

## 2022-07-14 PROCEDURE — 3079F PR MOST RECENT DIASTOLIC BLOOD PRESSURE 80-89 MM HG: ICD-10-PCS | Mod: CPTII,S$GLB,, | Performed by: SURGERY

## 2022-07-14 PROCEDURE — 3008F PR BODY MASS INDEX (BMI) DOCUMENTED: ICD-10-PCS | Mod: CPTII,S$GLB,, | Performed by: SURGERY

## 2022-07-14 NOTE — PROGRESS NOTES
[facility]  OUTPATIENT H&P    REASON FOR VISIT/CC: Left lower extremity abnormality    HPI: Awilda Mckeon is a 42 y.o. female with swelling and bruising to left calf.  U/S showed no DVT, but there was evidence of complex fluid within the calf.  There is no significant pain currently.  Overall she is feeling better and the calf changes are improving.    I have reviewed the patient's chart including prior progress notes, procedures and testing.     ROS:   Review of Systems    PROBLEM LIST:  Patient Active Problem List   Diagnosis    Encounter for general adult medical examination with abnormal findings    Tobacco dependence in remission    Chronic idiopathic constipation    Psoriatic arthritis    Gastroesophageal reflux disease without esophagitis    Intramural leiomyoma of uterus    Cyst of left ovary    Leg edema, left         HISTORY  Past Medical History:   Diagnosis Date    Abnormal Pap smear of cervix years ago    cyro done, nl since    Abnormal uterine bleeding (AUB) 10/23/2019    Arthritis     Menorrhagia 2019    Menorrhagia with regular cycle 2019    - followed by Gynecology and plan for endometrial ablation    Mitral valve prolapse     Pregnancy         Psoriasis        Past Surgical History:   Procedure Laterality Date    COLONOSCOPY N/A 6/3/2021    Procedure: COLONOSCOPY;  Surgeon: Alexa Palacios MD;  Location: [facility];  Service: Endoscopy;  Laterality: N/A;    COLPOSCOPY      remote    DILATION AND CURETTAGE OF UTERUS N/A 10/4/2019    Procedure: DILATION AND CURETTAGE, UTERUS;  Surgeon: Karlene Chavez MD;  Location: [facility];  Service: OB/GYN;  Laterality: N/A;    ENDOMETRIAL ABLATION      RHINOPLASTY      THERMAL ABLATION OF ENDOMETRIUM USING HYSTEROSCOPY N/A 10/4/2019    Procedure: ABLATION, ENDOMETRIUM, THERMAL, HYSTEROSCOPIC;  Surgeon: Karlene Chavez MD;  Location: [facility];  Service: OB/GYN;  Laterality: N/A;    TONSILLECTOMY       TYMPANOSTOMY TUBE PLACEMENT         Social History     Tobacco Use    Smoking status: Former Smoker     Packs/day: 0.50     Years: 20.00     Pack years: 10.00     Types: Cigarettes     Quit date: 2022     Years since quittin.3    Smokeless tobacco: Never Used   Substance Use Topics    Alcohol use: Yes     Alcohol/week: 2.0 standard drinks     Types: 1 Cans of beer, 1 Shots of liquor per week     Comment: socially    Drug use: No       Family History   Problem Relation Age of Onset    Hypertension Mother     Hyperlipidemia Father     No Known Problems Sister     Epilepsy Daughter     No Known Problems Son     No Known Problems Maternal Grandmother     No Known Problems Maternal Grandfather     No Known Problems Paternal Grandmother     No Known Problems Paternal Grandfather     Breast cancer Neg Hx     Colon cancer Neg Hx     Ovarian cancer Neg Hx          MEDS:  Current Outpatient Medications on File Prior to Visit   Medication Sig Dispense Refill    Lactobac no.41/Bifidobact no.7 (PROBIOTIC-10 ORAL) Take 1 tablet by mouth once daily at 6am.      linaCLOtide (LINZESS) 72 mcg Cap capsule Take 1 capsule (72 mcg total) by mouth once daily. 90 capsule 3    meloxicam (MOBIC) 15 MG tablet Take 1 tablet (15 mg total) by mouth once daily. 90 tablet 3     No current facility-administered medications on file prior to visit.       ALLERGIES:  Review of patient's allergies indicates:  No Known Allergies      VITALS:  Vitals:    22 1513   BP: 116/81   Pulse: 83         PHYSICAL EXAM:  Physical Exam  Constitutional:       Appearance: Normal appearance.   HENT:      Head: Normocephalic and atraumatic.   Musculoskeletal:      Comments: Left calf with resolving bruising, some minor TTP   Skin:     General: Skin is warm and dry.   Psychiatric:         Mood and Affect: Mood normal.         Behavior: Behavior normal.           LABS:  Lab Results   Component Value Date    WBC 7.12 2022    RBC 4.67  06/11/2022    HGB 12.7 06/11/2022    HCT 39.4 06/11/2022     06/11/2022     Lab Results   Component Value Date    GLU 88 06/11/2022     06/11/2022    K 3.9 06/11/2022     06/11/2022    CO2 25 06/11/2022    BUN 16 06/11/2022    CREATININE 0.53 06/11/2022    CALCIUM 8.5 (L) 06/11/2022     Lab Results   Component Value Date    ALT 13 06/11/2022    AST 20 06/11/2022    ALKPHOS 110 06/11/2022    BILITOT 0.3 06/11/2022     No results found for: MG, PHOS    STUDIES:  U/S images and reports were personally reviewed.        ASSESSMENT & PLAN:  42 y.o. female with what sounds like a ruptured Baker's cyst.  I don't think there is anything to do about it at this point as this is typically seen by Ortho.  I would repeat imaging if she has recurrent symptoms.      Vince Munguia M.D., F.A.C.S.  [id number] and General Surgery  [facility]

## 2022-08-18 ENCOUNTER — HOSPITAL ENCOUNTER (OUTPATIENT)
Dept: RADIOLOGY | Facility: HOSPITAL | Age: 42
Discharge: HOME | End: 2022-08-18
Attending: INTERNAL MEDICINE
Payer: COMMERCIAL

## 2022-08-18 ENCOUNTER — OFFICE VISIT (OUTPATIENT)
Dept: RHEUMATOLOGY | Facility: CLINIC | Age: 42
End: 2022-08-18
Payer: COMMERCIAL

## 2022-08-18 ENCOUNTER — LAB VISIT (OUTPATIENT)
Dept: LAB | Facility: HOSPITAL | Age: 42
End: 2022-08-18
Attending: INTERNAL MEDICINE
Payer: COMMERCIAL

## 2022-08-18 VITALS
HEART RATE: 78 BPM | BODY MASS INDEX: 24.41 KG/M2 | DIASTOLIC BLOOD PRESSURE: 76 MMHG | HEIGHT: 62 IN | OXYGEN SATURATION: 99 % | WEIGHT: 132.63 LBS | SYSTOLIC BLOOD PRESSURE: 114 MMHG

## 2022-08-18 DIAGNOSIS — Z79.899 ENCOUNTER FOR LONG-TERM (CURRENT) USE OF OTHER MEDICATIONS: ICD-10-CM

## 2022-08-18 DIAGNOSIS — L40.50 PSORIATIC ARTHRITIS: Primary | ICD-10-CM

## 2022-08-18 DIAGNOSIS — Z71.89 COUNSELING AND COORDINATION OF CARE: ICD-10-CM

## 2022-08-18 DIAGNOSIS — L40.50 PSORIATIC ARTHRITIS: ICD-10-CM

## 2022-08-18 LAB
ALBUMIN SERPL BCP-MCNC: 3.9 G/DL (ref 3.5–5.2)
ALP SERPL-CCNC: 83 U/L (ref 55–135)
ALT SERPL W/O P-5'-P-CCNC: 14 U/L (ref 10–44)
ANION GAP SERPL CALC-SCNC: 11 MMOL/L (ref 8–16)
AST SERPL-CCNC: 17 U/L (ref 10–40)
BASOPHILS # BLD AUTO: 0.04 K/UL (ref 0–0.2)
BASOPHILS NFR BLD: 0.5 % (ref 0–1.9)
BILIRUB SERPL-MCNC: 0.3 MG/DL (ref 0.1–1)
BUN SERPL-MCNC: 19 MG/DL (ref 6–20)
CALCIUM SERPL-MCNC: 9.6 MG/DL (ref 8.7–10.5)
CHLORIDE SERPL-SCNC: 105 MMOL/L (ref 95–110)
CO2 SERPL-SCNC: 25 MMOL/L (ref 23–29)
CREAT SERPL-MCNC: 0.8 MG/DL (ref 0.5–1.4)
CRP SERPL-MCNC: 30.3 MG/L (ref 0–8.2)
DIFFERENTIAL METHOD: ABNORMAL
EOSINOPHIL # BLD AUTO: 0.1 K/UL (ref 0–0.5)
EOSINOPHIL NFR BLD: 1.1 % (ref 0–8)
ERYTHROCYTE [DISTWIDTH] IN BLOOD BY AUTOMATED COUNT: 13.7 % (ref 11.5–14.5)
ERYTHROCYTE [SEDIMENTATION RATE] IN BLOOD BY PHOTOMETRIC METHOD: 10 MM/HR (ref 0–36)
EST. GFR  (NO RACE VARIABLE): >60 ML/MIN/1.73 M^2
GLUCOSE SERPL-MCNC: 74 MG/DL (ref 70–110)
HCT VFR BLD AUTO: 39.5 % (ref 37–48.5)
HGB BLD-MCNC: 12.6 G/DL (ref 12–16)
IMM GRANULOCYTES # BLD AUTO: 0.01 K/UL (ref 0–0.04)
IMM GRANULOCYTES NFR BLD AUTO: 0.1 % (ref 0–0.5)
LYMPHOCYTES # BLD AUTO: 1.7 K/UL (ref 1–4.8)
LYMPHOCYTES NFR BLD: 23 % (ref 18–48)
MCH RBC QN AUTO: 28 PG (ref 27–31)
MCHC RBC AUTO-ENTMCNC: 31.9 G/DL (ref 32–36)
MCV RBC AUTO: 88 FL (ref 82–98)
MONOCYTES # BLD AUTO: 0.6 K/UL (ref 0.3–1)
MONOCYTES NFR BLD: 7.5 % (ref 4–15)
NEUTROPHILS # BLD AUTO: 5 K/UL (ref 1.8–7.7)
NEUTROPHILS NFR BLD: 67.8 % (ref 38–73)
NRBC BLD-RTO: 0 /100 WBC
PLATELET # BLD AUTO: 305 K/UL (ref 150–450)
PMV BLD AUTO: 9.6 FL (ref 9.2–12.9)
POTASSIUM SERPL-SCNC: 3.5 MMOL/L (ref 3.5–5.1)
PROT SERPL-MCNC: 7.1 G/DL (ref 6–8.4)
RBC # BLD AUTO: 4.5 M/UL (ref 4–5.4)
SODIUM SERPL-SCNC: 141 MMOL/L (ref 136–145)
URATE SERPL-MCNC: 4.7 MG/DL (ref 2.4–5.7)
WBC # BLD AUTO: 7.31 K/UL (ref 3.9–12.7)

## 2022-08-18 PROCEDURE — 86200 CCP ANTIBODY: CPT | Performed by: INTERNAL MEDICINE

## 2022-08-18 PROCEDURE — 84550 ASSAY OF BLOOD/URIC ACID: CPT | Performed by: INTERNAL MEDICINE

## 2022-08-18 PROCEDURE — 86706 HEP B SURFACE ANTIBODY: CPT | Performed by: INTERNAL MEDICINE

## 2022-08-18 PROCEDURE — 86431 RHEUMATOID FACTOR QUANT: CPT | Performed by: INTERNAL MEDICINE

## 2022-08-18 PROCEDURE — 86140 C-REACTIVE PROTEIN: CPT | Performed by: INTERNAL MEDICINE

## 2022-08-18 PROCEDURE — 82306 VITAMIN D 25 HYDROXY: CPT | Performed by: INTERNAL MEDICINE

## 2022-08-18 PROCEDURE — 77077 JOINT SURVEY SINGLE VIEW: CPT | Mod: 26,,, | Performed by: RADIOLOGY

## 2022-08-18 PROCEDURE — 1159F MED LIST DOCD IN RCRD: CPT | Mod: CPTII,S$GLB,, | Performed by: INTERNAL MEDICINE

## 2022-08-18 PROCEDURE — 80053 COMPREHEN METABOLIC PANEL: CPT | Performed by: INTERNAL MEDICINE

## 2022-08-18 PROCEDURE — 3074F PR MOST RECENT SYSTOLIC BLOOD PRESSURE < 130 MM HG: ICD-10-PCS | Mod: CPTII,S$GLB,, | Performed by: INTERNAL MEDICINE

## 2022-08-18 PROCEDURE — 85025 COMPLETE CBC W/AUTO DIFF WBC: CPT | Performed by: INTERNAL MEDICINE

## 2022-08-18 PROCEDURE — 86704 HEP B CORE ANTIBODY TOTAL: CPT | Performed by: INTERNAL MEDICINE

## 2022-08-18 PROCEDURE — 77077 JOINT SURVEY SINGLE VIEW: CPT | Mod: TC

## 2022-08-18 PROCEDURE — 99204 OFFICE O/P NEW MOD 45 MIN: CPT | Mod: S$GLB,,, | Performed by: INTERNAL MEDICINE

## 2022-08-18 PROCEDURE — 3074F SYST BP LT 130 MM HG: CPT | Mod: CPTII,S$GLB,, | Performed by: INTERNAL MEDICINE

## 2022-08-18 PROCEDURE — 86038 ANTINUCLEAR ANTIBODIES: CPT | Performed by: INTERNAL MEDICINE

## 2022-08-18 PROCEDURE — 72202 X-RAY EXAM SI JOINTS 3/> VWS: CPT | Mod: 26,,, | Performed by: RADIOLOGY

## 2022-08-18 PROCEDURE — 84443 ASSAY THYROID STIM HORMONE: CPT | Performed by: INTERNAL MEDICINE

## 2022-08-18 PROCEDURE — 99999 PR PBB SHADOW E&M-EST. PATIENT-LVL IV: CPT | Mod: PBBFAC,,, | Performed by: INTERNAL MEDICINE

## 2022-08-18 PROCEDURE — 81374 HLA I TYPING 1 ANTIGEN LR: CPT | Mod: PO | Performed by: INTERNAL MEDICINE

## 2022-08-18 PROCEDURE — 3078F DIAST BP <80 MM HG: CPT | Mod: CPTII,S$GLB,, | Performed by: INTERNAL MEDICINE

## 2022-08-18 PROCEDURE — 3078F PR MOST RECENT DIASTOLIC BLOOD PRESSURE < 80 MM HG: ICD-10-PCS | Mod: CPTII,S$GLB,, | Performed by: INTERNAL MEDICINE

## 2022-08-18 PROCEDURE — 3008F PR BODY MASS INDEX (BMI) DOCUMENTED: ICD-10-PCS | Mod: CPTII,S$GLB,, | Performed by: INTERNAL MEDICINE

## 2022-08-18 PROCEDURE — 87389 HIV-1 AG W/HIV-1&-2 AB AG IA: CPT | Performed by: INTERNAL MEDICINE

## 2022-08-18 PROCEDURE — 72202 XR SACROILIAC JOINTS COMPLETE: ICD-10-PCS | Mod: 26,,, | Performed by: RADIOLOGY

## 2022-08-18 PROCEDURE — 1159F PR MEDICATION LIST DOCUMENTED IN MEDICAL RECORD: ICD-10-PCS | Mod: CPTII,S$GLB,, | Performed by: INTERNAL MEDICINE

## 2022-08-18 PROCEDURE — 99999 PR PBB SHADOW E&M-EST. PATIENT-LVL IV: ICD-10-PCS | Mod: PBBFAC,,, | Performed by: INTERNAL MEDICINE

## 2022-08-18 PROCEDURE — 85652 RBC SED RATE AUTOMATED: CPT | Performed by: INTERNAL MEDICINE

## 2022-08-18 PROCEDURE — 77077 XR ARTHRITIS SURVEY: ICD-10-PCS | Mod: 26,,, | Performed by: RADIOLOGY

## 2022-08-18 PROCEDURE — 87340 HEPATITIS B SURFACE AG IA: CPT | Performed by: INTERNAL MEDICINE

## 2022-08-18 PROCEDURE — 99204 PR OFFICE/OUTPT VISIT, NEW, LEVL IV, 45-59 MIN: ICD-10-PCS | Mod: S$GLB,,, | Performed by: INTERNAL MEDICINE

## 2022-08-18 PROCEDURE — 86803 HEPATITIS C AB TEST: CPT | Performed by: INTERNAL MEDICINE

## 2022-08-18 PROCEDURE — 3008F BODY MASS INDEX DOCD: CPT | Mod: CPTII,S$GLB,, | Performed by: INTERNAL MEDICINE

## 2022-08-18 PROCEDURE — 36415 COLL VENOUS BLD VENIPUNCTURE: CPT | Mod: PO | Performed by: INTERNAL MEDICINE

## 2022-08-18 PROCEDURE — 72202 X-RAY EXAM SI JOINTS 3/> VWS: CPT | Mod: TC,FY

## 2022-08-18 RX ORDER — LEFLUNOMIDE 20 MG/1
20 TABLET ORAL DAILY
Qty: 30 TABLET | Refills: 11 | Status: SHIPPED | OUTPATIENT
Start: 2022-08-18 | End: 2023-01-19

## 2022-08-18 RX ORDER — METHYLPREDNISOLONE 4 MG/1
TABLET ORAL
Qty: 1 EACH | Refills: 0 | Status: SHIPPED | OUTPATIENT
Start: 2022-08-18 | End: 2022-09-22

## 2022-08-18 NOTE — PROGRESS NOTES
RHEUMATOLOGY OUTPATIENT CLINIC NOTE    8/18/2022    Attending Rheumatologist: Fly Bruno  Primary Care Provider: Jenn Briones DO   Physician Requesting Consultation: Jenn Briones DO  [address]   [address]   [place] [address]  Chief Complaint/Reason For Consultation:  Establish Care, Referral (Jenn Briones MD 06/09/22), and Psoriatic Arthritis      Subjective:       HPI  Awilda Mckeon is a 42 y.o. White female with medical history noted below who presents for evaluation of PsA.    Patient presents for evaluation of PsA. She notes being diagnosed at the age of 34, PsO started in high school. MCP swelling, dactylitis, rash, +FHX. She has tried MTX, suffered from Nausea, but did note improvement. Been using NSAIDs since. Patient endorses worse joints to be hands, knees, feet, elbow, wrist. +Swelling, Dactylitis. Morning stiffness lasting hours. Had baker cyst rupture with left leg swelling in June has improved. +Bloody stools (Hx of hemorrhoids), Genital PsO, Nail changes, +FHX, Night sweats, fatigue. No EYE inflammation, IBP, Devi, wt loss.       Review of Systems   Constitutional: Negative for chills, fatigue, fever and unexpected weight change.   HENT: Negative for mouth sores and trouble swallowing.    Eyes: Negative for redness and eye dryness.   Respiratory: Negative for cough and shortness of breath.    Cardiovascular: Negative for chest pain.   Gastrointestinal: Negative for abdominal distention, constipation, diarrhea, nausea and vomiting.   Genitourinary: Negative for dysuria, genital sores and vaginal dryness.   Musculoskeletal: Positive for arthralgias, joint swelling and leg pain. Negative for back pain, gait problem, myalgias, neck pain, neck stiffness and joint deformity.   Integumentary:  Positive for rash.   Neurological: Negative for weakness, numbness and headaches.   Hematological: Negative for adenopathy. Does not bruise/bleed easily.    Psychiatric/Behavioral: Negative for confusion, decreased concentration and sleep disturbance. The patient is not nervous/anxious.    All other systems reviewed and are negative.       Chronic comorbid conditions affecting medical decision making today:  Past Medical History:   Diagnosis Date    Abnormal Pap smear of cervix years ago    cyro done, nl since    Abnormal uterine bleeding (AUB) 10/23/2019    Arthritis     Menorrhagia 2019    Menorrhagia with regular cycle 2019    - followed by Gynecology and plan for endometrial ablation    Mitral valve prolapse     Pregnancy         Psoriasis      Past Surgical History:   Procedure Laterality Date    COLONOSCOPY N/A 6/3/2021    Procedure: COLONOSCOPY;  Surgeon: Alexa Palacios MD;  Location: [facility];  Service: Endoscopy;  Laterality: N/A;    COLPOSCOPY      remote    DILATION AND CURETTAGE OF UTERUS N/A 10/4/2019    Procedure: DILATION AND CURETTAGE, UTERUS;  Surgeon: Karlene Chavez MD;  Location: [facility] OR;  Service: OB/GYN;  Laterality: N/A;    ENDOMETRIAL ABLATION      RHINOPLASTY      THERMAL ABLATION OF ENDOMETRIUM USING HYSTEROSCOPY N/A 10/4/2019    Procedure: ABLATION, ENDOMETRIUM, THERMAL, HYSTEROSCOPIC;  Surgeon: Karlene Chavez MD;  Location: [facility] OR;  Service: OB/GYN;  Laterality: N/A;    TONSILLECTOMY      TYMPANOSTOMY TUBE PLACEMENT       Family History   Problem Relation Age of Onset    Hypertension Mother     Hyperlipidemia Father     No Known Problems Sister     Epilepsy Daughter     No Known Problems Son     No Known Problems Maternal Grandmother     No Known Problems Maternal Grandfather     No Known Problems Paternal Grandmother     No Known Problems Paternal Grandfather     Breast cancer Neg Hx     Colon cancer Neg Hx     Ovarian cancer Neg Hx      Social History     Substance and Sexual Activity   Alcohol Use Yes    Alcohol/week: 2.0 standard drinks    Types: 1 Cans of beer, 1 Shots of liquor  per week    Comment: socially     Social History     Tobacco Use   Smoking Status Former Smoker    Packs/day: 0.50    Years: 20.00    Pack years: 10.00    Types: Cigarettes    Quit date: 2022    Years since quittin.4   Smokeless Tobacco Never Used     Social History     Substance and Sexual Activity   Drug Use No       Current Outpatient Medications:     Lactobac no.41/Bifidobact no.7 (PROBIOTIC-10 ORAL), Take 1 tablet by mouth once daily at 6am., Disp: , Rfl:     linaCLOtide (LINZESS) 72 mcg Cap capsule, Take 1 capsule (72 mcg total) by mouth once daily., Disp: 90 capsule, Rfl: 3    meloxicam (MOBIC) 15 MG tablet, Take 1 tablet (15 mg total) by mouth once daily., Disp: 90 tablet, Rfl: 3    leflunomide (ARAVA) 20 MG Tab, Take 1 tablet (20 mg total) by mouth once daily., Disp: 30 tablet, Rfl: 11    methylPREDNISolone (MEDROL DOSEPACK) 4 mg tablet, use as directed, Disp: 1 each, Rfl: 0     Objective:         Vitals:    22 1500   BP: 114/76   Pulse: 78     Physical Exam  Cannot make fist, +synovitis of left 2/rd PIP, right 3rd MCP, 4th finger dactylitis  Knee crepitus  +MTP squeeze test left   Negative ankle/MTP  SI Joint TTP  Lumbar extension/flexion ok    Reviewed old and all outside pertinent medical records available.    All lab results personally reviewed and interpreted by me.  Lab Results   Component Value Date    WBC 7.12 2022    HGB 12.7 2022    HCT 39.4 2022    MCV 84 2022    MCH 27.2 2022    MCHC 32.2 2022    RDW 13.4 2022     2022    MPV 9.2 2022       Lab Results   Component Value Date     2022    K 3.9 2022     2022    CO2 25 2022    GLU 88 2022    BUN 16 2022    CALCIUM 8.5 (L) 2022    PROT 6.9 2022    ALBUMIN 4.1 2022    BILITOT 0.3 2022    AST 20 2022    ALKPHOS 110 2022    ALT 13 2022       Lab Results   Component Value Date     COLORU Juliana 10/08/2021    APPEARANCEUA Hazy (A) 10/08/2021    SPECGRAV 1.020 10/08/2021    PHUR 7.0 10/08/2021    PROTEINUA Negative 10/08/2021    KETONESU Trace (A) 10/08/2021    LEUKOCYTESUR Negative 10/08/2021    NITRITE Negative 10/08/2021    UROBILINOGEN 4.0-6.0 (A) 10/08/2021       No results found for: CRP    No results found for: SEDRATE, ERYTHROCYTES    No results found for: JADYN, RF, SEDRATE    No components found for: 25OHVITDTOT, 35EITVDG2, 42QWEKHH1, METHODNOTE    No results found for: URICACID    No components found for: TSPOTTB        Imaging:  All imaging reviewed and independently interpreted by me.         ASSESSMENT / PLAN:     Awilda Mckeon is a 42 y.o. White female with:      1. Psoriatic arthritis  - patient with PsA, +synovitis, dactylitis  - prior use of MTX helped, will give trial of LEF 20mg, SE discussed, low threshold for biologics  - Medrol Pack Flare  - baseline labs and xrays  - advised to establish with DERM  - reassurance   - C-Reactive Protein; Future  - JADYN Screen w/Reflex; Future  - Rheumatoid Factor; Future  - CBC Auto Differential; Future  - Comprehensive Metabolic Panel; Future  - Cyclic Citrullinated Peptide Antibody, IgG; Future  - Sedimentation rate; Future  - Vitamin D; Future  - Uric Acid; Future  - TSH; Future  - Hepatitis B Surface Antigen; Future  - Hepatitis C Antibody; Future  - Hepatitis B Surface Ab, Qualitative; Future  - Hepatitis B Core Antibody, Total; Future  - XR Arthritis Survey; Future  - Quantiferon Gold TB; Future  - HIV 1/2 Ag/Ab (4th Gen); Future  - HLA B27 Antigen; Future  - X-Ray Sacroiliac Joints Complete; Future  - leflunomide (ARAVA) 20 MG Tab; Take 1 tablet (20 mg total) by mouth once daily.  Dispense: 30 tablet; Refill: 11  - POCT Urine Pregnancy  - methylPREDNISolone (MEDROL DOSEPACK) 4 mg tablet; use as directed  Dispense: 1 each; Refill: 0    2. Encounter for long-term (current) use of other medications  - discussed medication SE  -  monitor labs  - Pregnancy Counseling (she had ablation)     3. Other specified counseling  - over 10 minutes spent regarding below topics:  - Immunization counseling done.  - Weight loss counseling done.  - Nutrition and exercise counseling.  - Limitation of alcohol consumption.  - Regular exercise:  Aerobic and resistance.  - Medication counseling provided.    Follow up in about 2 months (around 10/18/2022).    Method of contact with patient concerns: David l[facility] Rheumat[facility]    Disclaimer:  This note is prepared using voice recognition software and as such is likely to have errors and has not been proof read. Please contact me for questions.     Time spent: 45 minutes in face to face discussion concerning diagnosis, prognosis, review of lab and test results, benefits of treatment as well as management of disease, counseling of patient and coordination of care between various health care providers.  Greater than half the time spent was used for coordination of care and counseling of patient.    Fly Bruno M.D.  Rheumatology Department   [facility]

## 2022-08-19 LAB
25(OH)D3+25(OH)D2 SERPL-MCNC: 51 NG/ML (ref 30–96)
ANA SER QL IF: NORMAL
CCP AB SER IA-ACNC: <0.5 U/ML
HIV 1+2 AB+HIV1 P24 AG SERPL QL IA: NEGATIVE
RHEUMATOID FACT SERPL-ACNC: 47 IU/ML (ref 0–15)
TSH SERPL DL<=0.005 MIU/L-ACNC: 0.67 UIU/ML (ref 0.4–4)

## 2022-08-22 LAB
HBV CORE AB SERPL QL IA: NEGATIVE
HBV SURFACE AB SER-ACNC: POSITIVE M[IU]/ML
HBV SURFACE AG SERPL QL IA: NEGATIVE
HCV AB SERPL QL IA: NEGATIVE

## 2022-09-08 ENCOUNTER — PATIENT MESSAGE (OUTPATIENT)
Dept: RHEUMATOLOGY | Facility: CLINIC | Age: 42
End: 2022-09-08
Payer: COMMERCIAL

## 2022-09-12 PROBLEM — Z00.01 ENCOUNTER FOR GENERAL ADULT MEDICAL EXAMINATION WITH ABNORMAL FINDINGS: Status: RESOLVED | Noted: 2019-09-25 | Resolved: 2022-09-12

## 2022-09-18 LAB
HLA B27 INTERPRETATION: NORMAL
HLA-B27 RELATED AG QL: NEGATIVE
HLA-B27 RELATED AG QL: NORMAL

## 2022-09-22 ENCOUNTER — OFFICE VISIT (OUTPATIENT)
Dept: OBSTETRICS AND GYNECOLOGY | Facility: CLINIC | Age: 42
End: 2022-09-22
Payer: COMMERCIAL

## 2022-09-22 VITALS
BODY MASS INDEX: 23.49 KG/M2 | DIASTOLIC BLOOD PRESSURE: 74 MMHG | HEIGHT: 62 IN | RESPIRATION RATE: 15 BRPM | SYSTOLIC BLOOD PRESSURE: 112 MMHG | HEART RATE: 81 BPM | WEIGHT: 127.63 LBS

## 2022-09-22 DIAGNOSIS — R39.15 URINARY URGENCY: ICD-10-CM

## 2022-09-22 DIAGNOSIS — Z12.31 SCREENING MAMMOGRAM FOR BREAST CANCER: ICD-10-CM

## 2022-09-22 DIAGNOSIS — Z01.419 WELL WOMAN EXAM WITH ROUTINE GYNECOLOGICAL EXAM: Primary | ICD-10-CM

## 2022-09-22 LAB
BILIRUB SERPL-MCNC: NORMAL MG/DL
BLOOD URINE, POC: NORMAL
CLARITY, POC UA: NORMAL
COLOR, POC UA: YELLOW
GLUCOSE UR QL STRIP: NORMAL
KETONES UR QL STRIP: NORMAL
LEUKOCYTE ESTERASE URINE, POC: NORMAL
NITRITE, POC UA: NORMAL
PH, POC UA: 7
PROTEIN, POC: NORMAL
SPECIFIC GRAVITY, POC UA: 1
UROBILINOGEN, POC UA: NORMAL

## 2022-09-22 PROCEDURE — 3078F PR MOST RECENT DIASTOLIC BLOOD PRESSURE < 80 MM HG: ICD-10-PCS | Mod: CPTII,S$GLB,, | Performed by: OBSTETRICS & GYNECOLOGY

## 2022-09-22 PROCEDURE — 99396 PR PREVENTIVE VISIT,EST,40-64: ICD-10-PCS | Mod: S$GLB,,, | Performed by: OBSTETRICS & GYNECOLOGY

## 2022-09-22 PROCEDURE — 81002 POCT URINE DIPSTICK WITHOUT MICROSCOPE: ICD-10-PCS | Mod: S$GLB,,, | Performed by: OBSTETRICS & GYNECOLOGY

## 2022-09-22 PROCEDURE — 3078F DIAST BP <80 MM HG: CPT | Mod: CPTII,S$GLB,, | Performed by: OBSTETRICS & GYNECOLOGY

## 2022-09-22 PROCEDURE — 81002 URINALYSIS NONAUTO W/O SCOPE: CPT | Mod: S$GLB,,, | Performed by: OBSTETRICS & GYNECOLOGY

## 2022-09-22 PROCEDURE — 1159F MED LIST DOCD IN RCRD: CPT | Mod: CPTII,S$GLB,, | Performed by: OBSTETRICS & GYNECOLOGY

## 2022-09-22 PROCEDURE — 99396 PREV VISIT EST AGE 40-64: CPT | Mod: S$GLB,,, | Performed by: OBSTETRICS & GYNECOLOGY

## 2022-09-22 PROCEDURE — 1159F PR MEDICATION LIST DOCUMENTED IN MEDICAL RECORD: ICD-10-PCS | Mod: CPTII,S$GLB,, | Performed by: OBSTETRICS & GYNECOLOGY

## 2022-09-22 PROCEDURE — 3008F BODY MASS INDEX DOCD: CPT | Mod: CPTII,S$GLB,, | Performed by: OBSTETRICS & GYNECOLOGY

## 2022-09-22 PROCEDURE — 3008F PR BODY MASS INDEX (BMI) DOCUMENTED: ICD-10-PCS | Mod: CPTII,S$GLB,, | Performed by: OBSTETRICS & GYNECOLOGY

## 2022-09-22 PROCEDURE — 3074F PR MOST RECENT SYSTOLIC BLOOD PRESSURE < 130 MM HG: ICD-10-PCS | Mod: CPTII,S$GLB,, | Performed by: OBSTETRICS & GYNECOLOGY

## 2022-09-22 PROCEDURE — 99999 PR PBB SHADOW E&M-EST. PATIENT-LVL III: ICD-10-PCS | Mod: PBBFAC,,, | Performed by: OBSTETRICS & GYNECOLOGY

## 2022-09-22 PROCEDURE — 3074F SYST BP LT 130 MM HG: CPT | Mod: CPTII,S$GLB,, | Performed by: OBSTETRICS & GYNECOLOGY

## 2022-09-22 PROCEDURE — 99999 PR PBB SHADOW E&M-EST. PATIENT-LVL III: CPT | Mod: PBBFAC,,, | Performed by: OBSTETRICS & GYNECOLOGY

## 2022-09-22 NOTE — PROGRESS NOTES
Subjective:    Patient ID: Awilda Mckeon is a 42 y.o. y.o. female.     Chief Complaint: Annual Well Woman Exam     History of Present Illness:  Awilda presents today for Annual Well Woman exam. She describes her menses as  absent, h/o ablation .She denies pelvic pain.  She denies breast tenderness, masses, nipple discharge. She denies GYN complaints. She admits to urgency with urination and bladder pain that comes and goes for the past 6 months.  She did do one course of antibiotics. She admits to menopausal symptoms such as hotflashes, vaginal dryness, and night sweats. She denies bloating, early satiety, or weight changes. She is sexually active.       Menstrual History:   No LMP recorded. Patient has had an ablation..     OB History    : 2  Para: 2  Term: 2            The following portions of the patient's history were reviewed and updated as appropriate: allergies, current medications, past family history, past medical history, past social history, past surgical history, and problem list.    ROS:   CONSTITUTIONAL: diaphoresis, fatigue, Denies: fever, chills, weight loss, weight gain  ENT: negative for sore throat, nasal congestion, nasal discharge, epistaxis, tinnitus, hearing loss  EYES: negative for blurry vision, decreased vision, loss of vision, eye pain, diplopia, photophobia, discharge  SKIN: rash, Denies: itching, hives  RESPIRATORY: negative for cough, hemoptysis, shortness of breath, pleuritic chest pain, wheezing  CARDIOVASCULAR: negative for chest pain, dyspnea on exertion, orthopnea, paroxysmal nocturnal dyspnea, edema, palpitations  BREAST: negative for breast  tenderness, breast mass, nipple discharge, or skin changes  GASTROINTESTINAL: negative for abdominal pain, flank pain, nausea, vomiting, diarrhea, constipation, black stool, blood in stool  GENITOURINARY: negative for abnormal vaginal bleeding, amenorrhea, decreased libido, dysuria, genital sores, hematuria, incontinence,  menorrhagia, pelvic pain, urinary frequency, vaginal discharge  HEMATOLOGIC/LYMPHATIC: negative for swollen lymph nodes, bleeding, bruising  MUSCULOSKELETAL: joint pain, negative for back pain,  joint stiffness, joint swelling, muscle pain, muscle weakness  NEUROLOGICAL: negative for dizzy/vertigo, headache, focal weakness, numbness/tingling, speech problems, loss of consciousness, confusion, memory loss  BEHAVORIAL/PSYCH: negative for anxiety, depression, psychosis  ENDOCRINE: negative for polydipsia/polyuria, palpitations, skin changes, temperature intolerance, unexpected weight changes  ALLERGIC/IMMUNOLOGIC: negative for urticaria, hay fever, angioedema      Objective:    Vital Signs:  Vitals:    09/22/22 1356   BP: 112/74   Pulse: 81   Resp: 15       Physical Exam:  General:  alert, cooperative, no distress   Skin:  Skin color, texture, turgor normal. No rashes or lesions   HEENT:  extra ocular movement intact, sclera clear, anicteric   Neck: supple, trachea midline, no adenopathy or thyromegally   Respiratory:  Normal effort   Breasts:  no discharge, erythema, tenderness, or palpable masses; no axillary lymphadenopathy   Abdomen:  soft, nontender, no palpable masses   Pelvis: External genitalia: normal general appearance  Urinary system: urethral meatus normal, bladder nontender  Vaginal: normal mucosa without prolapse or lesions  Cervix: normal appearance  Uterus: normal size, shape, position  Adnexa: normal size, nontender bilaterally   Extremities: Normal ROM; no edema, no cyanosis   Neurologial: Normal strength and tone. No focal numbness or weakness.   Psychiatric: normal mood, speech, dress, and thought processes         Assessment:       Healthy female exam.     1. Well woman exam with routine gynecological exam    2. Urinary urgency    3. Screening mammogram for breast cancer          Plan:      Well woman exam with routine gynecological exam    Urinary urgency  -     POCT URINE DIPSTICK WITHOUT  MICROSCOPE    Screening mammogram for breast cancer  -     Mammo Digital Screening Bilat w/ Madhu; Future; Expected date: 09/22/2022 2018 NEM with negative HPV    COUNSELING:  Awilda was counseled on STD pevention, use and side-effects of various contraceptive measures, A.C.O.G. Pap guidelines and recommendations for yearly pelvic exams in addition to recommendations for monthly self breast exams; to see her PCP for other health maintenance.

## 2022-10-13 ENCOUNTER — OFFICE VISIT (OUTPATIENT)
Dept: RHEUMATOLOGY | Facility: CLINIC | Age: 42
End: 2022-10-13
Payer: COMMERCIAL

## 2022-10-13 ENCOUNTER — SPECIALTY PHARMACY (OUTPATIENT)
Dept: PHARMACY | Facility: CLINIC | Age: 42
End: 2022-10-13
Payer: COMMERCIAL

## 2022-10-13 VITALS
SYSTOLIC BLOOD PRESSURE: 113 MMHG | HEIGHT: 62 IN | RESPIRATION RATE: 20 BRPM | BODY MASS INDEX: 23.46 KG/M2 | WEIGHT: 127.5 LBS | OXYGEN SATURATION: 97 % | DIASTOLIC BLOOD PRESSURE: 82 MMHG | HEART RATE: 80 BPM

## 2022-10-13 DIAGNOSIS — D84.821 IMMUNODEFICIENCY DUE TO TREATMENT WITH IMMUNOSUPPRESSIVE MEDICATION: ICD-10-CM

## 2022-10-13 DIAGNOSIS — Z71.89 COUNSELING AND COORDINATION OF CARE: ICD-10-CM

## 2022-10-13 DIAGNOSIS — Z79.899 IMMUNODEFICIENCY DUE TO TREATMENT WITH IMMUNOSUPPRESSIVE MEDICATION: ICD-10-CM

## 2022-10-13 DIAGNOSIS — Z79.52 LONG TERM SYSTEMIC STEROID USER: ICD-10-CM

## 2022-10-13 DIAGNOSIS — L40.50 PSA (PSORIATIC ARTHRITIS): Primary | ICD-10-CM

## 2022-10-13 DIAGNOSIS — L40.50 PSORIATIC ARTHRITIS: Primary | ICD-10-CM

## 2022-10-13 PROCEDURE — 3079F PR MOST RECENT DIASTOLIC BLOOD PRESSURE 80-89 MM HG: ICD-10-PCS | Mod: CPTII,S$GLB,, | Performed by: INTERNAL MEDICINE

## 2022-10-13 PROCEDURE — 3079F DIAST BP 80-89 MM HG: CPT | Mod: CPTII,S$GLB,, | Performed by: INTERNAL MEDICINE

## 2022-10-13 PROCEDURE — 1159F PR MEDICATION LIST DOCUMENTED IN MEDICAL RECORD: ICD-10-PCS | Mod: CPTII,S$GLB,, | Performed by: INTERNAL MEDICINE

## 2022-10-13 PROCEDURE — 1159F MED LIST DOCD IN RCRD: CPT | Mod: CPTII,S$GLB,, | Performed by: INTERNAL MEDICINE

## 2022-10-13 PROCEDURE — 99215 OFFICE O/P EST HI 40 MIN: CPT | Mod: S$GLB,,, | Performed by: INTERNAL MEDICINE

## 2022-10-13 PROCEDURE — 99999 PR PBB SHADOW E&M-EST. PATIENT-LVL III: ICD-10-PCS | Mod: PBBFAC,,, | Performed by: INTERNAL MEDICINE

## 2022-10-13 PROCEDURE — 99999 PR PBB SHADOW E&M-EST. PATIENT-LVL III: CPT | Mod: PBBFAC,,, | Performed by: INTERNAL MEDICINE

## 2022-10-13 PROCEDURE — 3074F PR MOST RECENT SYSTOLIC BLOOD PRESSURE < 130 MM HG: ICD-10-PCS | Mod: CPTII,S$GLB,, | Performed by: INTERNAL MEDICINE

## 2022-10-13 PROCEDURE — 3074F SYST BP LT 130 MM HG: CPT | Mod: CPTII,S$GLB,, | Performed by: INTERNAL MEDICINE

## 2022-10-13 PROCEDURE — 99215 PR OFFICE/OUTPT VISIT, EST, LEVL V, 40-54 MIN: ICD-10-PCS | Mod: S$GLB,,, | Performed by: INTERNAL MEDICINE

## 2022-10-13 RX ORDER — ADALIMUMAB 40MG/0.4ML
40 KIT SUBCUTANEOUS
Qty: 2 PEN | Refills: 11 | Status: SHIPPED | OUTPATIENT
Start: 2022-10-13 | End: 2023-09-11 | Stop reason: SDUPTHER

## 2022-10-13 RX ORDER — PREDNISONE 5 MG/1
TABLET ORAL
Qty: 35 TABLET | Refills: 0 | Status: SHIPPED | OUTPATIENT
Start: 2022-10-13 | End: 2022-11-10

## 2022-10-13 NOTE — TELEPHONE ENCOUNTER
Jazmine, this is Ginny Cary with [facility].  We are working on your prescription that your doctor has sent us. We will be working with your insurance to get this approved for you. We will be calling you along the way with updates on your medication.  If you have any questions, you can reach us at [phone].    Welcome call outcome: Patient/caregiver reached    Humira PA submitted via [facility] website (Key: [id number] - PA Case ID: [id number]).    Lian PIZARRO approved until 4/11/2023.      CaseId:[id number];Status:Approved;Review Type:Prior Auth;Coverage Start Date:09/13/2022;Coverage End Date:04/11/2023;

## 2022-10-13 NOTE — PROGRESS NOTES
RHEUMATOLOGY OUTPATIENT CLINIC NOTE    10/13/2022    Attending Rheumatologist: Fly Bruno  Primary Care Provider: Jenn Briones DO   Physician Requesting Consultation: No referring provider defined for this encounter.  Chief Complaint/Reason For Consultation:  No chief complaint on file.      Subjective:       HPI  Awilda Mckeon is a 42 y.o. White female with medical history noted below who presents for evaluation of PsA.  Patient presents for evaluation of PsA. She notes being diagnosed at the age of 34, PsO started in high school. MCP swelling, dactylitis, rash, +FHX. She has tried MTX, suffered from Nausea, but did note improvement. Been using NSAIDs since. Patient endorses worse joints to be hands, knees, feet, elbow, wrist. +Swelling, Dactylitis. Morning stiffness lasting hours. Had baker cyst rupture with left leg swelling in June has improved. +Bloody stools (Hx of hemorrhoids), Genital PsO, Nail changes, +FHX, Night sweats, fatigue. No EYE inflammation, IBP, Devi, wt loss.     Today  Patient here for follow up.   Last visit presented to establish care for PsA, started on LEF and labs obtained. She notes persistent joint pain, swelling and stiffness. Stressed, going thru divorce. Tolerating meds.     Review of Systems   Constitutional:  Negative for chills, fatigue, fever and unexpected weight change.   HENT:  Negative for mouth sores and trouble swallowing.    Eyes:  Negative for redness and eye dryness.   Respiratory:  Negative for cough and shortness of breath.    Cardiovascular:  Negative for chest pain.   Gastrointestinal:  Negative for abdominal distention, constipation, diarrhea, nausea and vomiting.   Genitourinary:  Negative for dysuria, genital sores and vaginal dryness.   Musculoskeletal:  Positive for arthralgias, joint swelling and leg pain. Negative for back pain, gait problem, myalgias, neck pain, neck stiffness and joint deformity.   Integumentary:  Positive for rash.    Neurological:  Negative for weakness, numbness and headaches.   Hematological:  Negative for adenopathy. Does not bruise/bleed easily.   Psychiatric/Behavioral:  Negative for confusion, decreased concentration and sleep disturbance. The patient is not nervous/anxious.    All other systems reviewed and are negative.     Chronic comorbid conditions affecting medical decision making today:  Past Medical History:   Diagnosis Date    Abnormal Pap smear of cervix years ago    cyro done, nl since    Abnormal uterine bleeding (AUB) 10/23/2019    Arthritis     Menorrhagia 2019    Menorrhagia with regular cycle 2019    - followed by Gynecology and plan for endometrial ablation    Mitral valve prolapse     Pregnancy         Psoriasis      Past Surgical History:   Procedure Laterality Date    COLONOSCOPY N/A 6/3/2021    Procedure: COLONOSCOPY;  Surgeon: Alexa Palacios MD;  Location: [facility];  Service: Endoscopy;  Laterality: N/A;    COLPOSCOPY      remote    DILATION AND CURETTAGE OF UTERUS N/A 10/4/2019    Procedure: DILATION AND CURETTAGE, UTERUS;  Surgeon: Karlene Chavez MD;  Location: [facility];  Service: OB/GYN;  Laterality: N/A;    ENDOMETRIAL ABLATION      RHINOPLASTY      THERMAL ABLATION OF ENDOMETRIUM USING HYSTEROSCOPY N/A 10/4/2019    Procedure: ABLATION, ENDOMETRIUM, THERMAL, HYSTEROSCOPIC;  Surgeon: Karlene Chavez MD;  Location: [facility];  Service: OB/GYN;  Laterality: N/A;    TONSILLECTOMY      TYMPANOSTOMY TUBE PLACEMENT       Family History   Problem Relation Age of Onset    Hypertension Mother     Hyperlipidemia Father     No Known Problems Sister     Epilepsy Daughter     No Known Problems Son     No Known Problems Maternal Grandmother     No Known Problems Maternal Grandfather     No Known Problems Paternal Grandmother     No Known Problems Paternal Grandfather     Breast cancer Neg Hx     Colon cancer Neg Hx     Ovarian cancer Neg Hx      Social History     Substance and Sexual  Activity   Alcohol Use Yes    Alcohol/week: 2.0 standard drinks    Types: 1 Cans of beer, 1 Shots of liquor per week    Comment: socially     Social History     Tobacco Use   Smoking Status Former    Packs/day: 0.50    Years: 20.00    Pack years: 10.00    Types: Cigarettes    Quit date: 2022    Years since quittin.6   Smokeless Tobacco Never     Social History     Substance and Sexual Activity   Drug Use No       Current Outpatient Medications:     adalimumab (HUMIRA,CF, PEN) 40 mg/0.4 mL PnKt, Inject 0.4 mLs (40 mg total) into the skin every 14 (fourteen) days., Disp: 2 pen, Rfl: 11    Lactobac no.41/Bifidobact no.7 (PROBIOTIC-10 ORAL), Take 1 tablet by mouth once daily at 6am., Disp: , Rfl:     leflunomide (ARAVA) 20 MG Tab, Take 1 tablet (20 mg total) by mouth once daily., Disp: 30 tablet, Rfl: 11    linaCLOtide (LINZESS) 72 mcg Cap capsule, Take 1 capsule (72 mcg total) by mouth once daily., Disp: 90 capsule, Rfl: 3    meloxicam (MOBIC) 15 MG tablet, Take 1 tablet (15 mg total) by mouth once daily., Disp: 90 tablet, Rfl: 3    predniSONE (DELTASONE) 5 MG tablet, Take 2 tablets (10 mg total) by mouth once daily for 7 days, THEN 1.5 tablets (7.5 mg total) once daily for 7 days, THEN 1 tablet (5 mg total) once daily for 7 days, THEN 0.5 tablets (2.5 mg total) once daily for 7 days., Disp: 35 tablet, Rfl: 0     Objective:         Vitals:    10/13/22 1308   BP: 113/82   Pulse: 80   Resp: 20     Physical Exam  Cannot make fist, +synovitis of left 2/3rd PIP, right 3rd MCP, 1st and 4th PIP  Knee crepitus  +MTP squeeze test left   Negative ankle/MTP  SI Joint TTP  Lumbar extension/flexion ok    Reviewed old and all outside pertinent medical records available.    All lab results personally reviewed and interpreted by me.  Lab Results   Component Value Date    WBC 7.31 2022    HGB 12.6 2022    HCT 39.5 2022    MCV 88 2022    MCH 28.0 2022    MCHC 31.9 (L) 2022    RDW 13.7  08/18/2022     08/18/2022    MPV 9.6 08/18/2022       Lab Results   Component Value Date     08/18/2022    K 3.5 08/18/2022     08/18/2022    CO2 25 08/18/2022    GLU 74 08/18/2022    BUN 19 08/18/2022    CALCIUM 9.6 08/18/2022    PROT 7.1 08/18/2022    ALBUMIN 3.9 08/18/2022    BILITOT 0.3 08/18/2022    AST 17 08/18/2022    ALKPHOS 83 08/18/2022    ALT 14 08/18/2022       Lab Results   Component Value Date    COLORU Yellow 09/22/2022    APPEARANCEUA Hazy (A) 10/08/2021    SPECGRAV 1.005 09/22/2022    PHUR 7 09/22/2022    PROTEINUA Negative 10/08/2021    KETONESU neg 09/22/2022    LEUKOCYTESUR Negative 10/08/2021    NITRITE neg 09/22/2022    UROBILINOGEN neg 09/22/2022       Lab Results   Component Value Date    CRP 30.3 (H) 08/18/2022       Lab Results   Component Value Date    SEDRATE 10 08/18/2022       Lab Results   Component Value Date    RF 47.0 (H) 08/18/2022    SEDRATE 10 08/18/2022       No components found for: 25OHVITDTOT, 09VNTPWB5, 58DZYRVH5, METHODNOTE    Lab Results   Component Value Date    URICACID 4.7 08/18/2022       No components found for: TSPOTTB        Imaging:  All imaging reviewed and independently interpreted by me.         ASSESSMENT / PLAN:     Awilda Mckeon is a 42 y.o. White female with:      1. Psoriatic arthritis  - patient with PsA, +synovitis, dactylitis  - last visit started on LEF, still with synovitis  - labs reviewed  - discussed biologic use, will start Humira, SE discussed  - prior use of MTX caused GI discomfort   - start PDN 10mg with taper over one month, for acute synovitis  - reassurance    2. Encounter for long-term (current) use of other medications  - discussed medication SE  - monitor labs  - Pregnancy Counseling (she had ablation)   - no live vaccines  - vaccines per guidelines    3. Steroid Use  -I discussed the potential adverse effects of long term PDN use (I.e. osteoporosis, increase risk of infection, skin fragility, weight gain, blood  sugar elevation and avascular necrosis). I encourage patient to take Calcium (1200mg daily) and Vit D (800 units daily) while on PDN.     4. Other specified counseling  - over 10 minutes spent regarding below topics:  - Immunization counseling done.  - Weight loss counseling done.  - Nutrition and exercise counseling.  - Limitation of alcohol consumption.  - Regular exercise:  Aerobic and resistance.  - Medication counseling provided.    Follow up in about 3 months (around 1/13/2023).    Method of contact with patient concerns: [facility]    Disclaimer:  This note is prepared using voice recognition software and as such is likely to have errors and has not been proof read. Please contact me for questions.     Time spent: 40 minutes in face to face discussion concerning diagnosis, prognosis, review of lab and test results, benefits of treatment as well as management of disease, counseling of patient and coordination of care between various health care providers.  Greater than half the time spent was used for coordination of care and counseling of patient.    Fly Bruno M.D.  Rheumatology Department   [facility]

## 2022-10-14 ENCOUNTER — SPECIALTY PHARMACY (OUTPATIENT)
Dept: PHARMACY | Facility: CLINIC | Age: 42
End: 2022-10-14
Payer: COMMERCIAL

## 2022-10-14 DIAGNOSIS — L40.50 PSORIATIC ARTHRITIS: Primary | ICD-10-CM

## 2022-10-14 RX ORDER — BIOTIN 1 MG
1000 TABLET ORAL 3 TIMES DAILY
COMMUNITY

## 2022-10-14 RX ORDER — MULTIVITAMIN
1 TABLET ORAL DAILY
COMMUNITY

## 2022-10-14 RX ORDER — CHOLECALCIFEROL (VITAMIN D3) 25 MCG
1000 TABLET ORAL DAILY
COMMUNITY

## 2022-10-14 RX ORDER — FERROUS SULFATE 325(65) MG
325 TABLET ORAL
COMMUNITY

## 2022-10-14 NOTE — TELEPHONE ENCOUNTER
BI Complete     Rx [facility] per [facility] website    No deductible  Max OOP $2000 ($309.48 accumulated)  Copay $5 with e-voucher  [facility] in network    FA not required.  Forwarding to initial.

## 2022-10-14 NOTE — TELEPHONE ENCOUNTER
Specialty Pharmacy - Initial Clinical Assessment    Specialty Medication Orders Linked to Encounter      Flowsheet Row Most Recent Value   Medication #1 adalimumab (HUMIRA,CF, PEN) 40 mg/0.4 mL PnKt (Order#[id number], Rx#[id number])          Patient Diagnosis   L40.50 - Psoriatic arthritis    Subjective    Awilda Mckeon is a 42 y.o. female, who is followed by the specialty pharmacy service for management and education.    Recent Encounters       Date Type Provider Description    10/14/2022 Specialty Pharmacy Abby Lloyd PharmD Initial Clinical Assessment    10/13/2022 Specialty Pharmacy Ginny Cary, [place] Referral Authorization          Clinical call attempts since last clinical assessment   No call attempts found.     Current Outpatient Medications   Medication Sig    adalimumab (HUMIRA,CF, PEN) 40 mg/0.4 mL PnKt Inject 0.4 mL (40 mg total) into the skin every 14 (fourteen) days.    Lactobac no.41/Bifidobact no.7 (PROBIOTIC-10 ORAL) Take 1 tablet by mouth once daily at 6am.    leflunomide (ARAVA) 20 MG Tab Take 1 tablet (20 mg total) by mouth once daily.    linaCLOtide (LINZESS) 72 mcg Cap capsule Take 1 capsule (72 mcg total) by mouth once daily.    meloxicam (MOBIC) 15 MG tablet Take 1 tablet (15 mg total) by mouth once daily.    predniSONE (DELTASONE) 5 MG tablet Take 2 tablets (10 mg total) by mouth once daily for 7 days, THEN 1.5 tablets (7.5 mg total) once daily for 7 days, THEN 1 tablet (5 mg total) once daily for 7 days, THEN 0.5 tablets (2.5 mg total) once daily for 7 days.   Last reviewed on 10/14/2022  2:55 PM by Abby Lloyd PharmD    Review of patient's allergies indicates:  No Known AllergiesLast reviewed on  10/14/2022 2:52 PM by Abby Lloyd    Drug Interactions    Drug interactions evaluated: yes  Clinically relevant drug interactions identified: no  Provided the patient with educational material regarding drug interactions: not applicable         Adverse Effects    Arthralgias: Pos  Joint swelling:  Pos  Neck stiffness: Pos       Assessment Questions - Documented Responses      Flowsheet Row Most Recent Value   Assessment    Medication Reconciliation completed for patient Yes   During the past 4 weeks, has patient missed any activities due to condition or medication? No   During the past 4 weeks, did patient have any of the following urgent care visits? None   Goals of Therapy Status Discussed (new start)   Status of the patients ability to self-administer: Is Able   All education points have been covered with patient? Yes, supplemental printed education provided   Welcome packet contents reviewed and discussed with patient? Yes   Assesment completed? Yes   Plan Therapy being initiated   Do you need to open a clinical intervention (i-vent)? No   Do you want to schedule first shipment? Yes          Refill Questions - Documented Responses      Flowsheet Row Most Recent Value   Patient Availability and HIPAA Verification    Does patient want to proceed with activity? Yes   HIPAA/medical authority confirmed? Yes   Relationship to patient of person spoken to? Self   Refill Screening Questions    When does the patient need to receive the medication? 10/18/22   Refill Delivery Questions    How will the patient receive the medication? MEDRx   When does the patient need to receive the medication? 10/18/22   Shipping Address Home   Address in [facility] confirmed and updated if neccessary? Yes   Expected Copay ($) 5   Is the patient able to afford the medication copay? Yes   Payment Method CC on file   Days supply of Refill 28   Supplies needed? No supplies needed   Refill activity completed? Yes   Refill activity plan Refill scheduled   Shipment/Pickup Date: 10/17/22            Objective    She has a past medical history of Abnormal Pap smear of cervix (years ago), Abnormal uterine bleeding (AUB) (10/23/2019), Arthritis, Menorrhagia (06/2019), Menorrhagia with regular cycle (9/25/2019), Mitral valve prolapse,  "Pregnancy, and Psoriasis.    Tried/failed medications: leflunomide, mtx    BP Readings from Last 4 Encounters:   10/13/22 113/82   09/22/22 112/74   08/18/22 114/76   07/14/22 116/81     Ht Readings from Last 4 Encounters:   10/13/22 5' 2" (1.575 m)   09/22/22 5' 2" (1.575 m)   08/18/22 5' 2" (1.575 m)   07/14/22 5' 2" (1.575 m)     Wt Readings from Last 4 Encounters:   10/13/22 57.8 kg (127 lb 8 oz)   09/22/22 57.9 kg (127 lb 9.6 oz)   08/18/22 60.1 kg (132 lb 9.6 oz)   07/14/22 61.7 kg (136 lb 0.4 oz)     Recent Labs   Lab Result Units 08/18/22  1550   Creatinine mg/dL 0.8   ALT U/L 14   AST U/L 17   Hepatitis B Surface Ag  Negative   Hep B S Ab  Positive   Hep B Core Total Ab  Negative     The goals of prescribed drug therapy management include:  Supporting patient to meet the prescriber's medical treatment objectives  Improving or maintaining quality of life  Maintaining optimal therapy adherence  Minimizing and managing side effects      Goals of Therapy Status: Discussed (new start)    Assessment/Plan  Patient plans to start therapy on 10/18/22      Indication, dosage, appropriateness, effectiveness, safety and convenience of her specialty medication(s) were reviewed today.     Patient Education   Patient received education on the following:   Expectations and possible outcomes of therapy  Proper use, timely administration, and missed dose management  Duration of therapy  Side effects, including prevention, minimization, and management  Contraindications and safety precautions  New or changed medications, including prescribe and over the counter medications and supplements  Reviews recommended vaccinations, as appropriate  Storage, safe handling, and disposal    Pt reports joint pain, swelling, and stiffness in her hands, knees, feet, and wrists. Pt currently flaring will be starting round of steroids and taking Arava. She reports stopping mtx years ago d/t Gi upset and only on NSAID to manage pain. Humira " initial consult complete. [facility] hours of operations and services discussed. Pt had no further questions or concerns.     Tasks added this encounter   11/8/2022 - Refill Call (Auto Added)  7/14/2023 - Clinical - Follow Up Assesement (Annual)   Tasks due within next 3 months   No tasks due.     Abby Lloyd, PharmD  [facility]  [address] [place] [id number]  Phone: [phone]  Fax: [phone]

## 2022-11-08 ENCOUNTER — SPECIALTY PHARMACY (OUTPATIENT)
Dept: PHARMACY | Facility: CLINIC | Age: 42
End: 2022-11-08
Payer: COMMERCIAL

## 2022-11-08 NOTE — TELEPHONE ENCOUNTER
Specialty Pharmacy - Refill Coordination    Specialty Medication Orders Linked to Encounter      Flowsheet Row Most Recent Value   Medication #1 adalimumab (HUMIRA,CF, PEN) 40 mg/0.4 mL PnKt (Order#[id number], Rx#[id number])            Refill Questions - Documented Responses      Flowsheet Row Most Recent Value   Patient Availability and HIPAA Verification    Does patient want to proceed with activity? Yes   HIPAA/medical authority confirmed? Yes   Relationship to patient of person spoken to? Self   Refill Screening Questions    Changes to allergies? No   Changes to medications? No   New conditions since last clinic visit? No   Unplanned office visit, urgent care, ED, or hospital admission in the last 4 weeks? No   How does patient/caregiver feel medication is working? Good   Financial problems or insurance changes? No   How many doses of your specialty medications were missed in the last 4 weeks? 0   Would patient like to speak to a pharmacist? No   When does the patient need to receive the medication? 11/15/22   Refill Delivery Questions    How will the patient receive the medication? MEDRx   When does the patient need to receive the medication? 11/15/22   Shipping Address Home   Address in [facility] confirmed and updated if neccessary? Yes   Expected Copay ($) 5   Is the patient able to afford the medication copay? Yes   Payment Method CC on file   Days supply of Refill 28   Supplies needed? No supplies needed   Refill activity completed? Yes   Refill activity plan Refill scheduled   Shipment/Pickup Date: 11/11/22            Current Outpatient Medications   Medication Sig    adalimumab (HUMIRA,CF, PEN) 40 mg/0.4 mL PnKt Inject 0.4 mL (40 mg total) into the skin every 14 (fourteen) days.    biotin 1 mg tablet Take 1,000 mcg by mouth 3 (three) times daily.    ferrous sulfate (FEOSOL) 325 mg (65 mg iron) Tab tablet Take 325 mg by mouth daily with breakfast.    Lactobac no.41/Bifidobact no.7 (PROBIOTIC-10  ORAL) Take 1 tablet by mouth once daily at 6am.    leflunomide (ARAVA) 20 MG Tab Take 1 tablet (20 mg total) by mouth once daily.    linaCLOtide (LINZESS) 72 mcg Cap capsule Take 1 capsule (72 mcg total) by mouth once daily.    meloxicam (MOBIC) 15 MG tablet Take 1 tablet (15 mg total) by mouth once daily.    microfibrillar collagen powder Apply 1 g topically as needed.    multivitamin (THERAGRAN) per tablet Take 1 tablet by mouth once daily.    predniSONE (DELTASONE) 5 MG tablet Take 2 tablets (10 mg total) by mouth once daily for 7 days, THEN 1.5 tablets (7.5 mg total) once daily for 7 days, THEN 1 tablet (5 mg total) once daily for 7 days, THEN 0.5 tablets (2.5 mg total) once daily for 7 days.    vitamin D (VITAMIN D3) 1000 units Tab Take 1,000 Units by mouth once daily.   Last reviewed on 10/14/2022  2:55 PM by Abby Lloyd, PharmD    Review of patient's allergies indicates:  No Known Allergies Last reviewed on  10/14/2022 2:52 PM by Abby Lloyd      Tasks added this encounter   12/6/2022 - Refill Call (Auto Added)   Tasks due within next 3 months   No tasks due.     [facility]  [address] [place] [address]  Phone: [phone]  Fax: [phone]

## 2022-12-06 ENCOUNTER — SPECIALTY PHARMACY (OUTPATIENT)
Dept: PHARMACY | Facility: CLINIC | Age: 42
End: 2022-12-06
Payer: COMMERCIAL

## 2022-12-06 NOTE — TELEPHONE ENCOUNTER
Specialty Pharmacy - Refill Coordination    Specialty Medication Orders Linked to Encounter      Flowsheet Row Most Recent Value   Medication #1 adalimumab (HUMIRA,CF, PEN) 40 mg/0.4 mL PnKt (Order#[id number], Rx#[id number])            Refill Questions - Documented Responses      Flowsheet Row Most Recent Value   Patient Availability and HIPAA Verification    Does patient want to proceed with activity? Yes   HIPAA/medical authority confirmed? Yes   Relationship to patient of person spoken to? Self   Refill Screening Questions    Changes to allergies? No   Changes to medications? No   New conditions since last clinic visit? No   Unplanned office visit, urgent care, ED, or hospital admission in the last 4 weeks? No   How does patient/caregiver feel medication is working? Very good   Financial problems or insurance changes? No   How many doses of your specialty medications were missed in the last 4 weeks? 0   Would patient like to speak to a pharmacist? No   When does the patient need to receive the medication? 12/13/22   Refill Delivery Questions    How will the patient receive the medication? MEDRx   When does the patient need to receive the medication? 12/13/22   Shipping Address Home   Address in [facility] confirmed and updated if neccessary? Yes   Expected Copay ($) 5   Is the patient able to afford the medication copay? Yes   Payment Method CC on file   Days supply of Refill 28   Supplies needed? No supplies needed   Refill activity completed? Yes   Refill activity plan Refill scheduled   Shipment/Pickup Date: 12/09/22            Current Outpatient Medications   Medication Sig    adalimumab (HUMIRA,CF, PEN) 40 mg/0.4 mL PnKt Inject 0.4 mL (40 mg total) into the skin every 14 (fourteen) days.    biotin 1 mg tablet Take 1,000 mcg by mouth 3 (three) times daily.    ferrous sulfate (FEOSOL) 325 mg (65 mg iron) Tab tablet Take 325 mg by mouth daily with breakfast.    Lactobac no.41/Bifidobact no.7  (PROBIOTIC-10 ORAL) Take 1 tablet by mouth once daily at 6am.    leflunomide (ARAVA) 20 MG Tab Take 1 tablet (20 mg total) by mouth once daily.    linaCLOtide (LINZESS) 72 mcg Cap capsule Take 1 capsule (72 mcg total) by mouth once daily.    meloxicam (MOBIC) 15 MG tablet Take 1 tablet (15 mg total) by mouth once daily.    microfibrillar collagen powder Apply 1 g topically as needed.    multivitamin (THERAGRAN) per tablet Take 1 tablet by mouth once daily.    vitamin D (VITAMIN D3) 1000 units Tab Take 1,000 Units by mouth once daily.   Last reviewed on 10/14/2022  2:55 PM by Abby Lloyd, PharmJORDI    Review of patient's allergies indicates:  No Known Allergies Last reviewed on  10/14/2022 2:52 PM by Abby Lloyd      Tasks added this encounter   1/3/2023 - Refill Call (Auto Added)   Tasks due within next 3 months   No tasks due.     [facility]  [address] [facility] [address]  Phone: [phone]  Fax: [phone]

## 2023-01-03 ENCOUNTER — SPECIALTY PHARMACY (OUTPATIENT)
Dept: PHARMACY | Facility: CLINIC | Age: 43
End: 2023-01-03
Payer: COMMERCIAL

## 2023-01-03 DIAGNOSIS — L40.50 PSORIATIC ARTHRITIS: Primary | ICD-10-CM

## 2023-01-03 NOTE — TELEPHONE ENCOUNTER
Outgoing call regarding humira refill; per pt, she's due to inject on 1/10; informed her that a PA is required, and once approved [facility] will follow up to schedule delivery; routed to [facility] Abby

## 2023-01-04 NOTE — TELEPHONE ENCOUNTER
Specialty Pharmacy - Refill Coordination  Specialty Pharmacy - Medication/Referral Authorization    Specialty Medication Orders Linked to Encounter      Flowsheet Row Most Recent Value   Medication #1 adalimumab (HUMIRA,CF, PEN) 40 mg/0.4 mL PnKt (Order#[id number], Rx#[id number])            Refill Questions - Documented Responses      Flowsheet Row Most Recent Value   Patient Availability and HIPAA Verification    Does patient want to proceed with activity? Yes   HIPAA/medical authority confirmed? Yes   Relationship to patient of person spoken to? Self   Refill Screening Questions    Changes to allergies? No   Changes to medications? No   New conditions since last clinic visit? No   Unplanned office visit, urgent care, ED, or hospital admission in the last 4 weeks? No   How does patient/caregiver feel medication is working? Good   Financial problems or insurance changes? No   How many doses of your specialty medications were missed in the last 4 weeks? 0   Would patient like to speak to a pharmacist? No   When does the patient need to receive the medication? 01/10/23   Refill Delivery Questions    How will the patient receive the medication? MEDRx   When does the patient need to receive the medication? 01/10/23   Shipping Address Home   Address in [facility] confirmed and updated if neccessary? Yes   Expected Copay ($) 5   Is the patient able to afford the medication copay? Yes   Payment Method CC on file   Days supply of Refill 28   Supplies needed? No supplies needed   Refill activity completed? Yes   Refill activity plan Refill scheduled   Shipment/Pickup Date: 01/06/23            Current Outpatient Medications   Medication Sig    adalimumab (HUMIRA,CF, PEN) 40 mg/0.4 mL PnKt Inject 0.4 mL (40 mg total) into the skin every 14 (fourteen) days.    biotin 1 mg tablet Take 1,000 mcg by mouth 3 (three) times daily.    ferrous sulfate (FEOSOL) 325 mg (65 mg iron) Tab tablet Take 325 mg by mouth daily with  breakfast.    Lactobac no.41/Bifidobact no.7 (PROBIOTIC-10 ORAL) Take 1 tablet by mouth once daily at 6am.    leflunomide (ARAVA) 20 MG Tab Take 1 tablet (20 mg total) by mouth once daily.    linaCLOtide (LINZESS) 72 mcg Cap capsule Take 1 capsule (72 mcg total) by mouth once daily.    meloxicam (MOBIC) 15 MG tablet Take 1 tablet (15 mg total) by mouth once daily.    microfibrillar collagen powder Apply 1 g topically as needed.    multivitamin (THERAGRAN) per tablet Take 1 tablet by mouth once daily.    vitamin D (VITAMIN D3) 1000 units Tab Take 1,000 Units by mouth once daily.   Last reviewed on 10/14/2022  2:55 PM by Abby Lloyd PharmD    Review of patient's allergies indicates:  No Known Allergies Last reviewed on  10/14/2022 2:52 PM by Abby Lloyd      Tasks added this encounter   1/31/2023 - Refill Call (Auto Added)   Tasks due within next 3 months   No tasks due.     Abby Lloyd, PharmD  [facility]  [address] [place] [address]  Phone: [phone]  Fax: [phone]

## 2023-01-04 NOTE — TELEPHONE ENCOUNTER
Humira for PsA prior authorization continuation of therapy request submitted to her new plan. [facility] Key: [id number] CaseId:[id number] approved through 01/04/2024. High copayment, pt has commercial insurance.     Pt notified and consents to Humira Copay card enrollment, $0 copay at [facility].   RxBIN: [id number]  RxPCN:[id number]  RxGRP:[id number]  RxID: [id number]

## 2023-01-19 ENCOUNTER — LAB VISIT (OUTPATIENT)
Dept: LAB | Facility: HOSPITAL | Age: 43
End: 2023-01-19
Attending: INTERNAL MEDICINE
Payer: COMMERCIAL

## 2023-01-19 ENCOUNTER — OFFICE VISIT (OUTPATIENT)
Dept: RHEUMATOLOGY | Facility: CLINIC | Age: 43
End: 2023-01-19
Payer: COMMERCIAL

## 2023-01-19 ENCOUNTER — PATIENT MESSAGE (OUTPATIENT)
Dept: ADMINISTRATIVE | Facility: HOSPITAL | Age: 43
End: 2023-01-19
Payer: COMMERCIAL

## 2023-01-19 VITALS
DIASTOLIC BLOOD PRESSURE: 77 MMHG | BODY MASS INDEX: 23.92 KG/M2 | WEIGHT: 130 LBS | HEIGHT: 62 IN | HEART RATE: 95 BPM | OXYGEN SATURATION: 97 % | RESPIRATION RATE: 20 BRPM | SYSTOLIC BLOOD PRESSURE: 117 MMHG

## 2023-01-19 DIAGNOSIS — L40.50 PSA (PSORIATIC ARTHRITIS): Primary | ICD-10-CM

## 2023-01-19 DIAGNOSIS — L40.50 PSA (PSORIATIC ARTHRITIS): ICD-10-CM

## 2023-01-19 DIAGNOSIS — Z79.899 IMMUNODEFICIENCY DUE TO TREATMENT WITH IMMUNOSUPPRESSIVE MEDICATION: ICD-10-CM

## 2023-01-19 DIAGNOSIS — D84.821 IMMUNODEFICIENCY DUE TO TREATMENT WITH IMMUNOSUPPRESSIVE MEDICATION: ICD-10-CM

## 2023-01-19 DIAGNOSIS — Z71.89 COUNSELING AND COORDINATION OF CARE: ICD-10-CM

## 2023-01-19 PROBLEM — Z79.60 IMMUNODEFICIENCY DUE TO TREATMENT WITH IMMUNOSUPPRESSIVE MEDICATION: Status: ACTIVE | Noted: 2023-01-19

## 2023-01-19 PROBLEM — T45.1X5A IMMUNODEFICIENCY DUE TO TREATMENT WITH IMMUNOSUPPRESSIVE MEDICATION: Status: ACTIVE | Noted: 2023-01-19

## 2023-01-19 LAB
ALBUMIN SERPL BCP-MCNC: 4.4 G/DL (ref 3.5–5.2)
ALP SERPL-CCNC: 47 U/L (ref 55–135)
ALT SERPL W/O P-5'-P-CCNC: 10 U/L (ref 10–44)
ANION GAP SERPL CALC-SCNC: 11 MMOL/L (ref 8–16)
AST SERPL-CCNC: 15 U/L (ref 10–40)
BILIRUB SERPL-MCNC: 0.7 MG/DL (ref 0.1–1)
BUN SERPL-MCNC: 11 MG/DL (ref 6–20)
CALCIUM SERPL-MCNC: 9.8 MG/DL (ref 8.7–10.5)
CHLORIDE SERPL-SCNC: 105 MMOL/L (ref 95–110)
CO2 SERPL-SCNC: 25 MMOL/L (ref 23–29)
CREAT SERPL-MCNC: 0.7 MG/DL (ref 0.5–1.4)
CRP SERPL-MCNC: 0.4 MG/L (ref 0–8.2)
ERYTHROCYTE [SEDIMENTATION RATE] IN BLOOD BY PHOTOMETRIC METHOD: <2 MM/HR (ref 0–36)
EST. GFR  (NO RACE VARIABLE): >60 ML/MIN/1.73 M^2
GLUCOSE SERPL-MCNC: 105 MG/DL (ref 70–110)
POTASSIUM SERPL-SCNC: 4 MMOL/L (ref 3.5–5.1)
PROT SERPL-MCNC: 7.2 G/DL (ref 6–8.4)
SODIUM SERPL-SCNC: 141 MMOL/L (ref 136–145)
TSH SERPL DL<=0.005 MIU/L-ACNC: 0.59 UIU/ML (ref 0.4–4)

## 2023-01-19 PROCEDURE — 1159F PR MEDICATION LIST DOCUMENTED IN MEDICAL RECORD: ICD-10-PCS | Mod: CPTII,S$GLB,, | Performed by: INTERNAL MEDICINE

## 2023-01-19 PROCEDURE — 85652 RBC SED RATE AUTOMATED: CPT | Performed by: INTERNAL MEDICINE

## 2023-01-19 PROCEDURE — 99999 PR PBB SHADOW E&M-EST. PATIENT-LVL III: CPT | Mod: PBBFAC,,, | Performed by: INTERNAL MEDICINE

## 2023-01-19 PROCEDURE — 80053 COMPREHEN METABOLIC PANEL: CPT | Performed by: INTERNAL MEDICINE

## 2023-01-19 PROCEDURE — 1159F MED LIST DOCD IN RCRD: CPT | Mod: CPTII,S$GLB,, | Performed by: INTERNAL MEDICINE

## 2023-01-19 PROCEDURE — 3078F PR MOST RECENT DIASTOLIC BLOOD PRESSURE < 80 MM HG: ICD-10-PCS | Mod: CPTII,S$GLB,, | Performed by: INTERNAL MEDICINE

## 2023-01-19 PROCEDURE — 86140 C-REACTIVE PROTEIN: CPT | Performed by: INTERNAL MEDICINE

## 2023-01-19 PROCEDURE — 82306 VITAMIN D 25 HYDROXY: CPT | Performed by: INTERNAL MEDICINE

## 2023-01-19 PROCEDURE — 3008F BODY MASS INDEX DOCD: CPT | Mod: CPTII,S$GLB,, | Performed by: INTERNAL MEDICINE

## 2023-01-19 PROCEDURE — 85025 COMPLETE CBC W/AUTO DIFF WBC: CPT | Performed by: INTERNAL MEDICINE

## 2023-01-19 PROCEDURE — 36415 COLL VENOUS BLD VENIPUNCTURE: CPT | Mod: PO | Performed by: INTERNAL MEDICINE

## 2023-01-19 PROCEDURE — 3078F DIAST BP <80 MM HG: CPT | Mod: CPTII,S$GLB,, | Performed by: INTERNAL MEDICINE

## 2023-01-19 PROCEDURE — 99999 PR PBB SHADOW E&M-EST. PATIENT-LVL III: ICD-10-PCS | Mod: PBBFAC,,, | Performed by: INTERNAL MEDICINE

## 2023-01-19 PROCEDURE — 99214 OFFICE O/P EST MOD 30 MIN: CPT | Mod: S$GLB,,, | Performed by: INTERNAL MEDICINE

## 2023-01-19 PROCEDURE — 84443 ASSAY THYROID STIM HORMONE: CPT | Performed by: INTERNAL MEDICINE

## 2023-01-19 PROCEDURE — 3008F PR BODY MASS INDEX (BMI) DOCUMENTED: ICD-10-PCS | Mod: CPTII,S$GLB,, | Performed by: INTERNAL MEDICINE

## 2023-01-19 PROCEDURE — 3074F PR MOST RECENT SYSTOLIC BLOOD PRESSURE < 130 MM HG: ICD-10-PCS | Mod: CPTII,S$GLB,, | Performed by: INTERNAL MEDICINE

## 2023-01-19 PROCEDURE — 99214 PR OFFICE/OUTPT VISIT, EST, LEVL IV, 30-39 MIN: ICD-10-PCS | Mod: S$GLB,,, | Performed by: INTERNAL MEDICINE

## 2023-01-19 PROCEDURE — 3074F SYST BP LT 130 MM HG: CPT | Mod: CPTII,S$GLB,, | Performed by: INTERNAL MEDICINE

## 2023-01-19 NOTE — PROGRESS NOTES
RHEUMATOLOGY OUTPATIENT CLINIC NOTE    1/19/2023    Attending Rheumatologist: Fly Bruno  Primary Care Provider: Jenn Briones DO   Physician Requesting Consultation: No referring provider defined for this encounter.  Chief Complaint/Reason For Consultation:  No chief complaint on file.        Subjective:       HPI  Awilda Mckeon is a 42 y.o. White female with medical history noted below who presents for evaluation of PsA.  Patient presents for evaluation of PsA. She notes being diagnosed at the age of 34, PsO started in high school. MCP swelling, dactylitis, rash, +FHX. She has tried MTX, suffered from Nausea, but did note improvement. Been using NSAIDs since. Patient endorses worse joints to be hands, knees, feet, elbow, wrist. +Swelling, Dactylitis. Morning stiffness lasting hours. Had baker cyst rupture with left leg swelling in June has improved. +Bloody stools (Hx of hemorrhoids), Genital PsO, Nail changes, +FHX, Night sweats, fatigue. No EYE inflammation, IBP, Devi, wt loss.     Today  Patient here for follow up.   Last visit with active synovitis, started on Humira and PDN taper. She notes that her joints are doing great. She has noted that she is more snappy/angry. Also some brain fog. Otherwise improving, tolerating meds.       Review of Systems   Constitutional:  Negative for chills, fatigue, fever and unexpected weight change.   HENT:  Negative for mouth sores and trouble swallowing.    Eyes:  Negative for redness and eye dryness.   Respiratory:  Negative for cough and shortness of breath.    Cardiovascular:  Negative for chest pain.   Gastrointestinal:  Negative for abdominal distention, constipation, diarrhea, nausea and vomiting.   Genitourinary:  Negative for dysuria, genital sores and vaginal dryness.   Musculoskeletal:  Negative for arthralgias, back pain, gait problem, joint swelling, leg pain, myalgias, neck pain, neck stiffness and joint deformity.   Integumentary:   Positive for rash.   Neurological:  Negative for weakness, numbness and headaches.   Hematological:  Negative for adenopathy. Does not bruise/bleed easily.   Psychiatric/Behavioral:  Positive for confusion. Negative for decreased concentration and sleep disturbance. The patient is not nervous/anxious.    All other systems reviewed and are negative.     Chronic comorbid conditions affecting medical decision making today:  Past Medical History:   Diagnosis Date    Abnormal Pap smear of cervix years ago    cyro done, nl since    Abnormal uterine bleeding (AUB) 10/23/2019    Arthritis     Menorrhagia 2019    Menorrhagia with regular cycle 2019    - followed by Gynecology and plan for endometrial ablation    Mitral valve prolapse     Pregnancy         Psoriasis      Past Surgical History:   Procedure Laterality Date    COLONOSCOPY N/A 6/3/2021    Procedure: COLONOSCOPY;  Surgeon: Alexa Palacios MD;  Location: [facility];  Service: Endoscopy;  Laterality: N/A;    COLPOSCOPY      remote    DILATION AND CURETTAGE OF UTERUS N/A 10/4/2019    Procedure: DILATION AND CURETTAGE, UTERUS;  Surgeon: Karlene Chavez MD;  Location: [facility];  Service: OB/GYN;  Laterality: N/A;    ENDOMETRIAL ABLATION      RHINOPLASTY      THERMAL ABLATION OF ENDOMETRIUM USING HYSTEROSCOPY N/A 10/4/2019    Procedure: ABLATION, ENDOMETRIUM, THERMAL, HYSTEROSCOPIC;  Surgeon: Karlene Chavez MD;  Location: [facility];  Service: OB/GYN;  Laterality: N/A;    TONSILLECTOMY      TYMPANOSTOMY TUBE PLACEMENT       Family History   Problem Relation Age of Onset    Hypertension Mother     Hyperlipidemia Father     No Known Problems Sister     Epilepsy Daughter     No Known Problems Son     No Known Problems Maternal Grandmother     No Known Problems Maternal Grandfather     No Known Problems Paternal Grandmother     No Known Problems Paternal Grandfather     Breast cancer Neg Hx     Colon cancer Neg Hx     Ovarian cancer Neg Hx      Social  History     Substance and Sexual Activity   Alcohol Use Yes    Alcohol/week: 2.0 standard drinks    Types: 1 Cans of beer, 1 Shots of liquor per week    Comment: socially     Social History     Tobacco Use   Smoking Status Former    Packs/day: 0.50    Years: 20.00    Pack years: 10.00    Types: Cigarettes    Quit date: 2022    Years since quittin.8   Smokeless Tobacco Never     Social History     Substance and Sexual Activity   Drug Use No       Current Outpatient Medications:     adalimumab (HUMIRA,CF, PEN) 40 mg/0.4 mL PnKt, Inject 0.4 mL (40 mg total) into the skin every 14 (fourteen) days., Disp: 2 pen, Rfl: 11    biotin 1 mg tablet, Take 1,000 mcg by mouth 3 (three) times daily., Disp: , Rfl:     ferrous sulfate (FEOSOL) 325 mg (65 mg iron) Tab tablet, Take 325 mg by mouth daily with breakfast., Disp: , Rfl:     Lactobac no.41/Bifidobact no.7 (PROBIOTIC-10 ORAL), Take 1 tablet by mouth once daily at 6am., Disp: , Rfl:     leflunomide (ARAVA) 20 MG Tab, Take 1 tablet (20 mg total) by mouth once daily., Disp: 30 tablet, Rfl: 11    linaCLOtide (LINZESS) 72 mcg Cap capsule, Take 1 capsule (72 mcg total) by mouth once daily., Disp: 90 capsule, Rfl: 3    meloxicam (MOBIC) 15 MG tablet, Take 1 tablet (15 mg total) by mouth once daily., Disp: 90 tablet, Rfl: 3    microfibrillar collagen powder, Apply 1 g topically as needed., Disp: , Rfl:     multivitamin (THERAGRAN) per tablet, Take 1 tablet by mouth once daily., Disp: , Rfl:     vitamin D (VITAMIN D3) 1000 units Tab, Take 1,000 Units by mouth once daily., Disp: , Rfl:      Objective:         Vitals:    23 1458   BP: 117/77   Pulse: 95   Resp: 20     Physical Exam  Can make fist, no synovitis   Knee crepitus  Negative ankle/MTP  SI Joint TTP  Lumbar extension/flexion ok    Reviewed old and all outside pertinent medical records available.    All lab results personally reviewed and interpreted by me.  Lab Results   Component Value Date    WBC 7.31  08/18/2022    HGB 12.6 08/18/2022    HCT 39.5 08/18/2022    MCV 88 08/18/2022    MCH 28.0 08/18/2022    MCHC 31.9 (L) 08/18/2022    RDW 13.7 08/18/2022     08/18/2022    MPV 9.6 08/18/2022       Lab Results   Component Value Date     08/18/2022    K 3.5 08/18/2022     08/18/2022    CO2 25 08/18/2022    GLU 74 08/18/2022    BUN 19 08/18/2022    CALCIUM 9.6 08/18/2022    PROT 7.1 08/18/2022    ALBUMIN 3.9 08/18/2022    BILITOT 0.3 08/18/2022    AST 17 08/18/2022    ALKPHOS 83 08/18/2022    ALT 14 08/18/2022       Lab Results   Component Value Date    COLORU Yellow 09/22/2022    APPEARANCEUA Hazy (A) 10/08/2021    SPECGRAV 1.005 09/22/2022    PHUR 7 09/22/2022    PROTEINUA Negative 10/08/2021    KETONESU neg 09/22/2022    LEUKOCYTESUR Negative 10/08/2021    NITRITE neg 09/22/2022    UROBILINOGEN neg 09/22/2022       Lab Results   Component Value Date    CRP 30.3 (H) 08/18/2022       Lab Results   Component Value Date    SEDRATE 10 08/18/2022       Lab Results   Component Value Date    RF 47.0 (H) 08/18/2022    SEDRATE 10 08/18/2022       No components found for: 25OHVITDTOT, 46ZEIFVI9, 10YTBSUH5, METHODNOTE    Lab Results   Component Value Date    URICACID 4.7 08/18/2022       No components found for: TSPOTTB        Imaging:  All imaging reviewed and independently interpreted by me.         ASSESSMENT / PLAN:     Awilda Mckeon is a 42 y.o. White female with:      1. Psoriatic arthritis  - patient with PsA, +synovitis, dactylitis  - now controlled  - will stop LEF as she noted some anger issues, perhaps this is culprit, to monitor   - continue Humira  - update labs  - reassurance     2. Encounter for long-term (current) use of other medications  - monitor labs  - Pregnancy Counseling (she had ablation)   - no live vaccines  - vaccines per guidelines    3. Other specified counseling  - over 10 minutes spent regarding below topics:  - Immunization counseling done.  - Weight loss counseling  done.  - Nutrition and exercise counseling.  - Limitation of alcohol consumption.  - Regular exercise:  Aerobic and resistance.  - Medication counseling provided.    No follow-ups on file.    Method of contact with patient concerns: [facility]    Disclaimer:  This note is prepared using voice recognition software and as such is likely to have errors and has not been proof read. Please contact me for questions.     Time spent: 30 minutes in face to face discussion concerning diagnosis, prognosis, review of lab and test results, benefits of treatment as well as management of disease, counseling of patient and coordination of care between various health care providers.  Greater than half the time spent was used for coordination of care and counseling of patient.    Fly Bruno M.D.  Rheumatology Department   [facility]

## 2023-01-20 LAB
25(OH)D3+25(OH)D2 SERPL-MCNC: 64 NG/ML (ref 30–96)
BASOPHILS # BLD AUTO: 0.03 K/UL (ref 0–0.2)
BASOPHILS NFR BLD: 0.5 % (ref 0–1.9)
DIFFERENTIAL METHOD: NORMAL
EOSINOPHIL # BLD AUTO: 0.1 K/UL (ref 0–0.5)
EOSINOPHIL NFR BLD: 1 % (ref 0–8)
ERYTHROCYTE [DISTWIDTH] IN BLOOD BY AUTOMATED COUNT: 12.4 % (ref 11.5–14.5)
HCT VFR BLD AUTO: 43.5 % (ref 37–48.5)
HGB BLD-MCNC: 14.2 G/DL (ref 12–16)
IMM GRANULOCYTES # BLD AUTO: 0.01 K/UL (ref 0–0.04)
IMM GRANULOCYTES NFR BLD AUTO: 0.2 % (ref 0–0.5)
LYMPHOCYTES # BLD AUTO: 2.1 K/UL (ref 1–4.8)
LYMPHOCYTES NFR BLD: 35.4 % (ref 18–48)
MCH RBC QN AUTO: 28.7 PG (ref 27–31)
MCHC RBC AUTO-ENTMCNC: 32.6 G/DL (ref 32–36)
MCV RBC AUTO: 88 FL (ref 82–98)
MONOCYTES # BLD AUTO: 0.4 K/UL (ref 0.3–1)
MONOCYTES NFR BLD: 6.3 % (ref 4–15)
NEUTROPHILS # BLD AUTO: 3.4 K/UL (ref 1.8–7.7)
NEUTROPHILS NFR BLD: 56.6 % (ref 38–73)
NRBC BLD-RTO: 0 /100 WBC
PLATELET # BLD AUTO: 246 K/UL (ref 150–450)
PMV BLD AUTO: 10.7 FL (ref 9.2–12.9)
RBC # BLD AUTO: 4.95 M/UL (ref 4–5.4)
WBC # BLD AUTO: 6.05 K/UL (ref 3.9–12.7)

## 2023-01-23 ENCOUNTER — PATIENT MESSAGE (OUTPATIENT)
Dept: RHEUMATOLOGY | Facility: CLINIC | Age: 43
End: 2023-01-23
Payer: COMMERCIAL

## 2023-01-31 ENCOUNTER — SPECIALTY PHARMACY (OUTPATIENT)
Dept: PHARMACY | Facility: CLINIC | Age: 43
End: 2023-01-31
Payer: COMMERCIAL

## 2023-01-31 ENCOUNTER — PATIENT MESSAGE (OUTPATIENT)
Dept: PHARMACY | Facility: CLINIC | Age: 43
End: 2023-01-31
Payer: COMMERCIAL

## 2023-01-31 NOTE — TELEPHONE ENCOUNTER
Specialty Pharmacy - Refill Coordination    Specialty Medication Orders Linked to Encounter      Flowsheet Row Most Recent Value   Medication #1 adalimumab (HUMIRA,CF, PEN) 40 mg/0.4 mL PnKt (Order#[id number], Rx#[id number])            Refill Questions - Documented Responses      Flowsheet Row Most Recent Value   Patient Availability and HIPAA Verification    Does patient want to proceed with activity? Yes   HIPAA/medical authority confirmed? Yes   Relationship to patient of person spoken to? Self   Refill Screening Questions    Changes to allergies? No   Changes to medications? No   New conditions since last clinic visit? No   Unplanned office visit, urgent care, ED, or hospital admission in the last 4 weeks? No   How does patient/caregiver feel medication is working? Good   Financial problems or insurance changes? No   How many doses of your specialty medications were missed in the last 4 weeks? 0   Would patient like to speak to a pharmacist? No   When does the patient need to receive the medication? 02/07/23   Refill Delivery Questions    How will the patient receive the medication? MEDRx   When does the patient need to receive the medication? 02/07/23   Shipping Address Home   Address in [facility] confirmed and updated if neccessary? Yes   Expected Copay ($) 5   Is the patient able to afford the medication copay? Yes   Payment Method CC on file   Days supply of Refill 28   Supplies needed? No supplies needed   Refill activity completed? Yes   Refill activity plan Refill scheduled   Shipment/Pickup Date: 02/01/23            Current Outpatient Medications   Medication Sig    adalimumab (HUMIRA,CF, PEN) 40 mg/0.4 mL PnKt Inject 0.4 mL (40 mg total) into the skin every 14 (fourteen) days.    biotin 1 mg tablet Take 1,000 mcg by mouth 3 (three) times daily.    ferrous sulfate (FEOSOL) 325 mg (65 mg iron) Tab tablet Take 325 mg by mouth daily with breakfast.    Lactobac no.41/Bifidobact no.7 (PROBIOTIC-10  ORAL) Take 1 tablet by mouth once daily at 6am.    linaCLOtide (LINZESS) 72 mcg Cap capsule Take 1 capsule (72 mcg total) by mouth once daily.    meloxicam (MOBIC) 15 MG tablet Take 1 tablet (15 mg total) by mouth once daily.    microfibrillar collagen powder Apply 1 g topically as needed.    multivitamin (THERAGRAN) per tablet Take 1 tablet by mouth once daily.    vitamin D (VITAMIN D3) 1000 units Tab Take 1,000 Units by mouth once daily.   Last reviewed on 1/19/2023  2:57 PM by Hien Rodrigues MA    Review of patient's allergies indicates:  No Known Allergies Last reviewed on  10/14/2022 2:52 PM by Abby Lloyd      Tasks added this encounter   2/28/2023 - Refill Call (Auto Added)   Tasks due within next 3 months   No tasks due.     Iza Land, PharmD  [facility]  [address] [place] [address]  Phone: [phone]  Fax: [phone]

## 2023-02-28 ENCOUNTER — SPECIALTY PHARMACY (OUTPATIENT)
Dept: PHARMACY | Facility: CLINIC | Age: 43
End: 2023-02-28
Payer: COMMERCIAL

## 2023-02-28 NOTE — TELEPHONE ENCOUNTER
Specialty Pharmacy - Refill Coordination    Specialty Medication Orders Linked to Encounter      Flowsheet Row Most Recent Value   Medication #1 adalimumab (HUMIRA,CF, PEN) 40 mg/0.4 mL PnKt (Order#[id number], Rx#[id number])            Refill Questions - Documented Responses      Flowsheet Row Most Recent Value   Patient Availability and HIPAA Verification    Does patient want to proceed with activity? Yes   HIPAA/medical authority confirmed? Yes   Relationship to patient of person spoken to? Self   Refill Screening Questions    Changes to allergies? No   Changes to medications? No   New conditions since last clinic visit? No   Unplanned office visit, urgent care, ED, or hospital admission in the last 4 weeks? No   How does patient/caregiver feel medication is working? Good   Financial problems or insurance changes? No   How many doses of your specialty medications were missed in the last 4 weeks? 0   Would patient like to speak to a pharmacist? No   When does the patient need to receive the medication? 03/07/23   Refill Delivery Questions    How will the patient receive the medication? MEDRx   When does the patient need to receive the medication? 03/07/23   Shipping Address Home   Address in [facility] confirmed and updated if neccessary? Yes   Expected Copay ($) 0   Is the patient able to afford the medication copay? Yes   Payment Method zero copay   Days supply of Refill 28   Supplies needed? No supplies needed   Refill activity completed? Yes   Refill activity plan Refill scheduled   Shipment/Pickup Date: 03/02/23            Current Outpatient Medications   Medication Sig    adalimumab (HUMIRA,CF, PEN) 40 mg/0.4 mL PnKt Inject 0.4 mL (40 mg total) into the skin every 14 (fourteen) days.    biotin 1 mg tablet Take 1,000 mcg by mouth 3 (three) times daily.    ferrous sulfate (FEOSOL) 325 mg (65 mg iron) Tab tablet Take 325 mg by mouth daily with breakfast.    Lactobac no.41/Bifidobact no.7 (PROBIOTIC-10  ORAL) Take 1 tablet by mouth once daily at 6am.    linaCLOtide (LINZESS) 72 mcg Cap capsule Take 1 capsule (72 mcg total) by mouth once daily.    meloxicam (MOBIC) 15 MG tablet Take 1 tablet (15 mg total) by mouth once daily.    microfibrillar collagen powder Apply 1 g topically as needed.    multivitamin (THERAGRAN) per tablet Take 1 tablet by mouth once daily.    vitamin D (VITAMIN D3) 1000 units Tab Take 1,000 Units by mouth once daily.   Last reviewed on 1/19/2023  2:57 PM by Hien Rodrigues MA    Review of patient's allergies indicates:  No Known Allergies Last reviewed on  10/14/2022 2:52 PM by Abby Lloyd      Tasks added this encounter   3/28/2023 - Refill Call (Auto Added)   Tasks due within next 3 months   No tasks due.     [facility]  [address] [facility] [address]  Phone: [phone]  Fax: [phone]

## 2023-03-28 ENCOUNTER — SPECIALTY PHARMACY (OUTPATIENT)
Dept: PHARMACY | Facility: CLINIC | Age: 43
End: 2023-03-28
Payer: COMMERCIAL

## 2023-03-28 NOTE — TELEPHONE ENCOUNTER
Specialty Pharmacy - Refill Coordination    Specialty Medication Orders Linked to Encounter      Flowsheet Row Most Recent Value   Medication #1 adalimumab (HUMIRA,CF, PEN) 40 mg/0.4 mL PnKt (Order#[id number], Rx#[id number])            Refill Questions - Documented Responses      Flowsheet Row Most Recent Value   Patient Availability and HIPAA Verification    Does patient want to proceed with activity? Yes   HIPAA/medical authority confirmed? Yes   Relationship to patient of person spoken to? Self   Refill Screening Questions    Changes to allergies? No   Changes to medications? No   New conditions since last clinic visit? No   Unplanned office visit, urgent care, ED, or hospital admission in the last 4 weeks? No   How does patient/caregiver feel medication is working? Good   Financial problems or insurance changes? No   How many doses of your specialty medications were missed in the last 4 weeks? 0   Would patient like to speak to a pharmacist? No   When does the patient need to receive the medication? 04/04/23   Refill Delivery Questions    How will the patient receive the medication? MEDRx   When does the patient need to receive the medication? 04/04/23   Shipping Address Home   Address in [facility] confirmed and updated if neccessary? Yes   Expected Copay ($) 0   Is the patient able to afford the medication copay? Yes   Payment Method zero copay   Days supply of Refill 28   Supplies needed? No supplies needed   Refill activity completed? Yes   Refill activity plan Refill scheduled   Shipment/Pickup Date: 03/29/23            Current Outpatient Medications   Medication Sig    adalimumab (HUMIRA,CF, PEN) 40 mg/0.4 mL PnKt Inject 0.4 mL (40 mg total) into the skin every 14 (fourteen) days.    biotin 1 mg tablet Take 1,000 mcg by mouth 3 (three) times daily.    ferrous sulfate (FEOSOL) 325 mg (65 mg iron) Tab tablet Take 325 mg by mouth daily with breakfast.    Lactobac no.41/Bifidobact no.7 (PROBIOTIC-10  ORAL) Take 1 tablet by mouth once daily at 6am.    linaCLOtide (LINZESS) 72 mcg Cap capsule Take 1 capsule (72 mcg total) by mouth once daily.    meloxicam (MOBIC) 15 MG tablet Take 1 tablet (15 mg total) by mouth once daily.    microfibrillar collagen powder Apply 1 g topically as needed.    multivitamin (THERAGRAN) per tablet Take 1 tablet by mouth once daily.    vitamin D (VITAMIN D3) 1000 units Tab Take 1,000 Units by mouth once daily.   Last reviewed on 1/19/2023  2:57 PM by Hien Rodrigues MA    Review of patient's allergies indicates:  No Known Allergies Last reviewed on  10/14/2022 2:52 PM by Abby Lloyd      Tasks added this encounter   4/25/2023 - Refill Call (Auto Added)   Tasks due within next 3 months   No tasks due.     [facility]  [address] [facility] [address]  Phone: [phone]  Fax: [phone]

## 2023-04-18 ENCOUNTER — PATIENT MESSAGE (OUTPATIENT)
Dept: ADMINISTRATIVE | Facility: HOSPITAL | Age: 43
End: 2023-04-18
Payer: COMMERCIAL

## 2023-04-25 ENCOUNTER — SPECIALTY PHARMACY (OUTPATIENT)
Dept: PHARMACY | Facility: CLINIC | Age: 43
End: 2023-04-25
Payer: COMMERCIAL

## 2023-04-25 NOTE — TELEPHONE ENCOUNTER
Specialty Pharmacy - Refill Coordination    Specialty Medication Orders Linked to Encounter      Flowsheet Row Most Recent Value   Medication #1 adalimumab (HUMIRA,CF, PEN) 40 mg/0.4 mL PnKt (Order#[id number], Rx#[id number])            Refill Questions - Documented Responses      Flowsheet Row Most Recent Value   Patient Availability and HIPAA Verification    Does patient want to proceed with activity? Yes   HIPAA/medical authority confirmed? Yes   Relationship to patient of person spoken to? Self   Refill Screening Questions    Changes to allergies? No   Changes to medications? No   New conditions since last clinic visit? No   Unplanned office visit, urgent care, ED, or hospital admission in the last 4 weeks? No   How does patient/caregiver feel medication is working? Good   Financial problems or insurance changes? No   How many doses of your specialty medications were missed in the last 4 weeks? 0   Would patient like to speak to a pharmacist? No   When does the patient need to receive the medication? 05/02/23   Refill Delivery Questions    How will the patient receive the medication? MEDRx   When does the patient need to receive the medication? 05/02/23   Shipping Address Home   Address in [facility] confirmed and updated if neccessary? Yes   Expected Copay ($) 0   Is the patient able to afford the medication copay? Yes   Payment Method zero copay   Days supply of Refill 28   Supplies needed? No supplies needed   Refill activity completed? Yes   Refill activity plan Refill scheduled   Shipment/Pickup Date: 04/26/23            Current Outpatient Medications   Medication Sig    adalimumab (HUMIRA,CF, PEN) 40 mg/0.4 mL PnKt Inject 0.4 mL (40 mg total) into the skin every 14 (fourteen) days.    biotin 1 mg tablet Take 1,000 mcg by mouth 3 (three) times daily.    ferrous sulfate (FEOSOL) 325 mg (65 mg iron) Tab tablet Take 325 mg by mouth daily with breakfast.    Lactobac no.41/Bifidobact no.7 (PROBIOTIC-10  ORAL) Take 1 tablet by mouth once daily at 6am.    linaCLOtide (LINZESS) 72 mcg Cap capsule Take 1 capsule (72 mcg total) by mouth once daily.    meloxicam (MOBIC) 15 MG tablet Take 1 tablet (15 mg total) by mouth once daily.    microfibrillar collagen powder Apply 1 g topically as needed.    multivitamin (THERAGRAN) per tablet Take 1 tablet by mouth once daily.    vitamin D (VITAMIN D3) 1000 units Tab Take 1,000 Units by mouth once daily.   Last reviewed on 1/19/2023  2:57 PM by Hien Rodrigues MA    Review of patient's allergies indicates:  No Known Allergies Last reviewed on  10/14/2022 2:52 PM by Abby Lloyd      Tasks added this encounter   No tasks added.   Tasks due within next 3 months   4/25/2023 - Refill Coordination Outreach (1 time occurrence)  7/14/2023 - Clinical Assessment (1 year recurrence)     [facility]  [address] [place] [address]  Phone: [phone]  Fax: [phone]

## 2023-04-27 ENCOUNTER — OFFICE VISIT (OUTPATIENT)
Dept: RHEUMATOLOGY | Facility: CLINIC | Age: 43
End: 2023-04-27
Payer: COMMERCIAL

## 2023-04-27 VITALS
BODY MASS INDEX: 23.65 KG/M2 | HEIGHT: 62 IN | SYSTOLIC BLOOD PRESSURE: 125 MMHG | DIASTOLIC BLOOD PRESSURE: 86 MMHG | HEART RATE: 82 BPM | OXYGEN SATURATION: 97 % | WEIGHT: 128.5 LBS

## 2023-04-27 DIAGNOSIS — Z71.89 COUNSELING AND COORDINATION OF CARE: ICD-10-CM

## 2023-04-27 DIAGNOSIS — L40.50 PSA (PSORIATIC ARTHRITIS): Primary | ICD-10-CM

## 2023-04-27 DIAGNOSIS — D84.821 IMMUNODEFICIENCY DUE TO TREATMENT WITH IMMUNOSUPPRESSIVE MEDICATION: ICD-10-CM

## 2023-04-27 DIAGNOSIS — Z79.899 IMMUNODEFICIENCY DUE TO TREATMENT WITH IMMUNOSUPPRESSIVE MEDICATION: ICD-10-CM

## 2023-04-27 PROCEDURE — 99214 OFFICE O/P EST MOD 30 MIN: CPT | Mod: S$GLB,,, | Performed by: INTERNAL MEDICINE

## 2023-04-27 PROCEDURE — 99999 PR PBB SHADOW E&M-EST. PATIENT-LVL IV: ICD-10-PCS | Mod: PBBFAC,,, | Performed by: INTERNAL MEDICINE

## 2023-04-27 PROCEDURE — 3074F PR MOST RECENT SYSTOLIC BLOOD PRESSURE < 130 MM HG: ICD-10-PCS | Mod: CPTII,S$GLB,, | Performed by: INTERNAL MEDICINE

## 2023-04-27 PROCEDURE — 3079F DIAST BP 80-89 MM HG: CPT | Mod: CPTII,S$GLB,, | Performed by: INTERNAL MEDICINE

## 2023-04-27 PROCEDURE — 1159F PR MEDICATION LIST DOCUMENTED IN MEDICAL RECORD: ICD-10-PCS | Mod: CPTII,S$GLB,, | Performed by: INTERNAL MEDICINE

## 2023-04-27 PROCEDURE — 3008F BODY MASS INDEX DOCD: CPT | Mod: CPTII,S$GLB,, | Performed by: INTERNAL MEDICINE

## 2023-04-27 PROCEDURE — 1159F MED LIST DOCD IN RCRD: CPT | Mod: CPTII,S$GLB,, | Performed by: INTERNAL MEDICINE

## 2023-04-27 PROCEDURE — 99999 PR PBB SHADOW E&M-EST. PATIENT-LVL IV: CPT | Mod: PBBFAC,,, | Performed by: INTERNAL MEDICINE

## 2023-04-27 PROCEDURE — 99214 PR OFFICE/OUTPT VISIT, EST, LEVL IV, 30-39 MIN: ICD-10-PCS | Mod: S$GLB,,, | Performed by: INTERNAL MEDICINE

## 2023-04-27 PROCEDURE — 3079F PR MOST RECENT DIASTOLIC BLOOD PRESSURE 80-89 MM HG: ICD-10-PCS | Mod: CPTII,S$GLB,, | Performed by: INTERNAL MEDICINE

## 2023-04-27 PROCEDURE — 3008F PR BODY MASS INDEX (BMI) DOCUMENTED: ICD-10-PCS | Mod: CPTII,S$GLB,, | Performed by: INTERNAL MEDICINE

## 2023-04-27 PROCEDURE — 3074F SYST BP LT 130 MM HG: CPT | Mod: CPTII,S$GLB,, | Performed by: INTERNAL MEDICINE

## 2023-04-27 RX ORDER — CLOBETASOL PROPIONATE 0.5 MG/G
CREAM TOPICAL 2 TIMES DAILY
Qty: 45 G | Refills: 1 | Status: SHIPPED | OUTPATIENT
Start: 2023-04-27

## 2023-04-27 NOTE — PROGRESS NOTES
RHEUMATOLOGY OUTPATIENT CLINIC NOTE    4/27/2023    Attending Rheumatologist: Fly Bruno  Primary Care Provider: Jenn Briones DO   Physician Requesting Consultation: No referring provider defined for this encounter.  Chief Complaint/Reason For Consultation:  Psoriatic Arthritis        Subjective:       HPI  Awilda Mckeon is a 42 y.o. White female with medical history noted below who presents for evaluation of PsA.  Patient presents for evaluation of PsA. She notes being diagnosed at the age of 34, PsO started in high school. MCP swelling, dactylitis, rash, +FHX. She has tried MTX, suffered from Nausea, but did note improvement. Been using NSAIDs since. Patient endorses worse joints to be hands, knees, feet, elbow, wrist. +Swelling, Dactylitis. Morning stiffness lasting hours. Had baker cyst rupture with left leg swelling in June has improved. +Bloody stools (Hx of hemorrhoids), Genital PsO, Nail changes, +FHX, Night sweats, fatigue. No EYE inflammation, IBP, Devi, wt loss.     Today  Patient here for follow up.   Last visit Humira continued for PsA, LEF stopped due to being more aggressive. She notes since stopping LEF improvement of her mood. She notes her skin is doing great with the exception of between her thighs. Notes joints doing well except left 2nd digit which she feel some finger stiffness, otherwise stable, tolerating meds.       Review of Systems   Constitutional:  Negative for chills, fatigue, fever and unexpected weight change.   HENT:  Negative for mouth sores and trouble swallowing.    Eyes:  Negative for redness and eye dryness.   Respiratory:  Negative for cough and shortness of breath.    Cardiovascular:  Negative for chest pain.   Gastrointestinal:  Negative for abdominal distention, constipation, diarrhea, nausea and vomiting.   Genitourinary:  Negative for dysuria, genital sores and vaginal dryness.   Musculoskeletal:  Negative for arthralgias, back pain, gait  problem, joint swelling, leg pain, myalgias, neck pain, neck stiffness and joint deformity.   Integumentary:  Positive for rash.   Neurological:  Negative for weakness, numbness and headaches.   Hematological:  Negative for adenopathy. Does not bruise/bleed easily.   Psychiatric/Behavioral:  Negative for confusion, decreased concentration and sleep disturbance. The patient is not nervous/anxious.    All other systems reviewed and are negative.     Chronic comorbid conditions affecting medical decision making today:  Past Medical History:   Diagnosis Date    Abnormal Pap smear of cervix years ago    cyro done, nl since    Abnormal uterine bleeding (AUB) 10/23/2019    Arthritis     Menorrhagia 2019    Menorrhagia with regular cycle 2019    - followed by Gynecology and plan for endometrial ablation    Mitral valve prolapse     Pregnancy         Psoriasis      Past Surgical History:   Procedure Laterality Date    COLONOSCOPY N/A 6/3/2021    Procedure: COLONOSCOPY;  Surgeon: Alexa Palacios MD;  Location: [facility];  Service: Endoscopy;  Laterality: N/A;    COLPOSCOPY      remote    DILATION AND CURETTAGE OF UTERUS N/A 10/4/2019    Procedure: DILATION AND CURETTAGE, UTERUS;  Surgeon: Karlene Chavez MD;  Location: [facility];  Service: OB/GYN;  Laterality: N/A;    ENDOMETRIAL ABLATION      RHINOPLASTY      THERMAL ABLATION OF ENDOMETRIUM USING HYSTEROSCOPY N/A 10/4/2019    Procedure: ABLATION, ENDOMETRIUM, THERMAL, HYSTEROSCOPIC;  Surgeon: Karlene Chavez MD;  Location: [facility];  Service: OB/GYN;  Laterality: N/A;    TONSILLECTOMY      TYMPANOSTOMY TUBE PLACEMENT       Family History   Problem Relation Age of Onset    Hypertension Mother     Hyperlipidemia Father     No Known Problems Sister     Epilepsy Daughter     No Known Problems Son     No Known Problems Maternal Grandmother     No Known Problems Maternal Grandfather     No Known Problems Paternal Grandmother     No Known Problems Paternal  Grandfather     Breast cancer Neg Hx     Colon cancer Neg Hx     Ovarian cancer Neg Hx      Social History     Substance and Sexual Activity   Alcohol Use Yes    Alcohol/week: 2.0 standard drinks    Types: 1 Cans of beer, 1 Shots of liquor per week    Comment: socially     Social History     Tobacco Use   Smoking Status Former    Packs/day: 0.50    Years: 20.00    Pack years: 10.00    Types: Cigarettes    Quit date: 2022    Years since quittin.1   Smokeless Tobacco Never     Social History     Substance and Sexual Activity   Drug Use No       Current Outpatient Medications:     adalimumab (HUMIRA,CF, PEN) 40 mg/0.4 mL PnKt, Inject 0.4 mL (40 mg total) into the skin every 14 (fourteen) days., Disp: 2 pen, Rfl: 11    biotin 1 mg tablet, Take 1,000 mcg by mouth 3 (three) times daily., Disp: , Rfl:     ferrous sulfate (FEOSOL) 325 mg (65 mg iron) Tab tablet, Take 325 mg by mouth daily with breakfast., Disp: , Rfl:     Lactobac no.41/Bifidobact no.7 (PROBIOTIC-10 ORAL), Take 1 tablet by mouth once daily at 6am., Disp: , Rfl:     microfibrillar collagen powder, Apply 1 g topically as needed., Disp: , Rfl:     multivitamin (THERAGRAN) per tablet, Take 1 tablet by mouth once daily., Disp: , Rfl:     vitamin D (VITAMIN D3) 1000 units Tab, Take 1,000 Units by mouth once daily., Disp: , Rfl:     clobetasoL (TEMOVATE) 0.05 % cream, Apply topically 2 (two) times daily., Disp: 45 g, Rfl: 1    linaCLOtide (LINZESS) 72 mcg Cap capsule, Take 1 capsule (72 mcg total) by mouth once daily. (Patient not taking: Reported on 2023), Disp: 90 capsule, Rfl: 3    meloxicam (MOBIC) 15 MG tablet, Take 1 tablet (15 mg total) by mouth once daily. (Patient not taking: Reported on 2023), Disp: 90 tablet, Rfl: 3     Objective:         Vitals:    23 1341   BP: 125/86   Pulse: 82     Physical Exam  Can make fist, no synovitis   Knee crepitus  Negative ankle/MTP  Lumbar extension/flexion ok    Reviewed old and all outside  pertinent medical records available.    All lab results personally reviewed and interpreted by me.  Lab Results   Component Value Date    WBC 6.05 01/19/2023    HGB 14.2 01/19/2023    HCT 43.5 01/19/2023    MCV 88 01/19/2023    MCH 28.7 01/19/2023    MCHC 32.6 01/19/2023    RDW 12.4 01/19/2023     01/19/2023    MPV 10.7 01/19/2023       Lab Results   Component Value Date     01/19/2023    K 4.0 01/19/2023     01/19/2023    CO2 25 01/19/2023     01/19/2023    BUN 11 01/19/2023    CALCIUM 9.8 01/19/2023    PROT 7.2 01/19/2023    ALBUMIN 4.4 01/19/2023    BILITOT 0.7 01/19/2023    AST 15 01/19/2023    ALKPHOS 47 (L) 01/19/2023    ALT 10 01/19/2023       Lab Results   Component Value Date    COLORU Yellow 09/22/2022    APPEARANCEUA Hazy (A) 10/08/2021    SPECGRAV 1.005 09/22/2022    PHUR 7 09/22/2022    PROTEINUA Negative 10/08/2021    KETONESU neg 09/22/2022    LEUKOCYTESUR Negative 10/08/2021    NITRITE neg 09/22/2022    UROBILINOGEN neg 09/22/2022       Lab Results   Component Value Date    CRP 0.4 01/19/2023       Lab Results   Component Value Date    SEDRATE <2 01/19/2023       Lab Results   Component Value Date    RF 47.0 (H) 08/18/2022    SEDRATE <2 01/19/2023       No components found for: 25OHVITDTOT, 02ELCRAP6, 90YNNAIT6, METHODNOTE    Lab Results   Component Value Date    URICACID 4.7 08/18/2022       No components found for: TSPOTTB        Imaging:  All imaging reviewed and independently interpreted by me.         ASSESSMENT / PLAN:     Awilda Mckeon is a 42 y.o. White female with:      1. Psoriatic arthritis  - patient with PsA, +synovitis, dactylitis  - doing well  - notes rash still present in thighs for this will give trial of Topical Clobetasol if does not improve will consider perhaps Otezla or DERM referral   - continue Humira   - hands ok today  - reassurance     2. Encounter for long-term (current) use of other medications  - monitor labs  - Pregnancy Counseling (she  had ablation)   - no live vaccines  - vaccines per guidelines  - immunosuppression/infectious precautions discussed     3. Other specified counseling  - over 10 minutes spent regarding below topics:  - Immunization counseling done.  - Weight loss counseling done.  - Nutrition and exercise counseling.  - Limitation of alcohol consumption.  - Regular exercise:  Aerobic and resistance.  - Medication counseling provided.    Follow up in about 4 months (around 8/27/2023).    Method of contact with patient concerns: [facility]    Disclaimer:  This note is prepared using voice recognition software and as such is likely to have errors and has not been proof read. Please contact me for questions.     Time spent: 30 minutes in face to face discussion concerning diagnosis, prognosis, review of lab and test results, benefits of treatment as well as management of disease, counseling of patient and coordination of care between various health care providers.  Greater than half the time spent was used for coordination of care and counseling of patient.    Fly Bruno M.D.  Rheumatology Department   [facility]

## 2023-05-17 ENCOUNTER — SPECIALTY PHARMACY (OUTPATIENT)
Dept: PHARMACY | Facility: CLINIC | Age: 43
End: 2023-05-17
Payer: COMMERCIAL

## 2023-05-17 NOTE — TELEPHONE ENCOUNTER
Specialty Pharmacy - Refill Coordination    Specialty Medication Orders Linked to Encounter      Flowsheet Row Most Recent Value   Medication #1 adalimumab (HUMIRA,CF, PEN) 40 mg/0.4 mL PnKt (Order#[id number], Rx#[id number])            Refill Questions - Documented Responses      Flowsheet Row Most Recent Value   Patient Availability and HIPAA Verification    Does patient want to proceed with activity? Yes   HIPAA/medical authority confirmed? Yes   Relationship to patient of person spoken to? Self   Refill Screening Questions    Changes to allergies? No   Changes to medications? No   New conditions since last clinic visit? No   Unplanned office visit, urgent care, ED, or hospital admission in the last 4 weeks? No   How does patient/caregiver feel medication is working? Excellent   Financial problems or insurance changes? No   How many doses of your specialty medications were missed in the last 4 weeks? 0   Would patient like to speak to a pharmacist? No   When does the patient need to receive the medication? 05/26/23   Refill Delivery Questions    How will the patient receive the medication? MEDRx   When does the patient need to receive the medication? 05/26/23   Shipping Address Home   Address in [facility] confirmed and updated if neccessary? Yes   Expected Copay ($) 0   Is the patient able to afford the medication copay? Yes   Payment Method zero copay   Days supply of Refill 28   Supplies needed? --  [Inj Kit]   Refill activity completed? Yes   Refill activity plan Refill scheduled   Shipment/Pickup Date: 05/22/23            Current Outpatient Medications   Medication Sig    adalimumab (HUMIRA,CF, PEN) 40 mg/0.4 mL PnKt Inject 0.4 mL (40 mg total) into the skin every 14 (fourteen) days.    biotin 1 mg tablet Take 1,000 mcg by mouth 3 (three) times daily.    clobetasoL (TEMOVATE) 0.05 % cream Apply topically 2 (two) times daily.    ferrous sulfate (FEOSOL) 325 mg (65 mg iron) Tab tablet Take 325 mg by  mouth daily with breakfast.    Lactobac no.41/Bifidobact no.7 (PROBIOTIC-10 ORAL) Take 1 tablet by mouth once daily at 6am.    linaCLOtide (LINZESS) 72 mcg Cap capsule Take 1 capsule (72 mcg total) by mouth once daily. (Patient not taking: Reported on 4/27/2023)    meloxicam (MOBIC) 15 MG tablet Take 1 tablet (15 mg total) by mouth once daily. (Patient not taking: Reported on 4/27/2023)    microfibrillar collagen powder Apply 1 g topically as needed.    multivitamin (THERAGRAN) per tablet Take 1 tablet by mouth once daily.    vitamin D (VITAMIN D3) 1000 units Tab Take 1,000 Units by mouth once daily.   Last reviewed on 4/27/2023  1:44 PM by Anna Amador MA    Review of patient's allergies indicates:  No Known Allergies Last reviewed on  4/27/2023 1:43 PM by Anna Amador      Tasks added this encounter   No tasks added.   Tasks due within next 3 months   7/14/2023 - Clinical Assessment (1 year recurrence)  5/20/2023 - Refill Coordination Outreach (1 time occurrence)     Kain Johnson, PharmD  [facility]  [address]  [facility] [address]  Phone: [phone]  Fax: [phone]

## 2023-05-31 ENCOUNTER — PATIENT MESSAGE (OUTPATIENT)
Dept: RHEUMATOLOGY | Facility: CLINIC | Age: 43
End: 2023-05-31
Payer: COMMERCIAL

## 2023-06-01 ENCOUNTER — PATIENT OUTREACH (OUTPATIENT)
Dept: ADMINISTRATIVE | Facility: HOSPITAL | Age: 43
End: 2023-06-01
Payer: COMMERCIAL

## 2023-06-01 DIAGNOSIS — Z00.01 ENCOUNTER FOR ROUTINE ADULT HEALTH EXAMINATION WITH ABNORMAL FINDINGS: Primary | ICD-10-CM

## 2023-06-01 RX ORDER — METHYLPREDNISOLONE 4 MG/1
TABLET ORAL
Qty: 1 EACH | Refills: 0 | Status: SHIPPED | OUTPATIENT
Start: 2023-06-01 | End: 2023-10-20

## 2023-06-01 RX ORDER — SULFAMETHOXAZOLE AND TRIMETHOPRIM 800; 160 MG/1; MG/1
1 TABLET ORAL 2 TIMES DAILY
Qty: 14 TABLET | Refills: 0 | Status: SHIPPED | OUTPATIENT
Start: 2023-06-01 | End: 2023-10-20

## 2023-06-01 NOTE — TELEPHONE ENCOUNTER
Called patient and discussed rash and she reports some pus, will start Bactrim. Given steroid pack as well for red itchy rash post humira.

## 2023-06-20 ENCOUNTER — PATIENT MESSAGE (OUTPATIENT)
Dept: PHARMACY | Facility: CLINIC | Age: 43
End: 2023-06-20
Payer: COMMERCIAL

## 2023-07-13 ENCOUNTER — SPECIALTY PHARMACY (OUTPATIENT)
Dept: PHARMACY | Facility: CLINIC | Age: 43
End: 2023-07-13
Payer: COMMERCIAL

## 2023-07-13 NOTE — TELEPHONE ENCOUNTER
Specialty Pharmacy - Refill Coordination    Specialty Medication Orders Linked to Encounter      Flowsheet Row Most Recent Value   Medication #1 adalimumab (HUMIRA,CF, PEN) 40 mg/0.4 mL PnKt (Order#[id number], Rx#[id number])            Refill Questions - Documented Responses      Flowsheet Row Most Recent Value   Patient Availability and HIPAA Verification    Does patient want to proceed with activity? Yes   HIPAA/medical authority confirmed? Yes   Relationship to patient of person spoken to? Self   Refill Screening Questions    Changes to allergies? No   Changes to medications? No   New conditions since last clinic visit? No   Unplanned office visit, urgent care, ED, or hospital admission in the last 4 weeks? No   How does patient/caregiver feel medication is working? Very good   Financial problems or insurance changes? No   How many doses of your specialty medications were missed in the last 4 weeks? 0   Would patient like to speak to a pharmacist? No   When does the patient need to receive the medication? 07/21/23   Refill Delivery Questions    When does the patient need to receive the medication? 07/21/23   Shipping Address Home   Address in [facility] confirmed and updated if neccessary? Yes   Expected Copay ($) 0   Is the patient able to afford the medication copay? Yes   Payment Method zero copay   Days supply of Refill 28   Supplies needed? No supplies needed   Refill activity completed? Yes   Refill activity plan Refill scheduled   Shipment/Pickup Date: 07/18/23            Current Outpatient Medications   Medication Sig    adalimumab (HUMIRA,CF, PEN) 40 mg/0.4 mL PnKt Inject 0.4 mL (40 mg total) into the skin every 14 (fourteen) days.    biotin 1 mg tablet Take 1,000 mcg by mouth 3 (three) times daily.    clobetasoL (TEMOVATE) 0.05 % cream Apply topically 2 (two) times daily.    ferrous sulfate (FEOSOL) 325 mg (65 mg iron) Tab tablet Take 325 mg by mouth daily with breakfast.    Lactobac  no.41/Bifidobact no.7 (PROBIOTIC-10 ORAL) Take 1 tablet by mouth once daily at 6am.    linaCLOtide (LINZESS) 72 mcg Cap capsule Take 1 capsule (72 mcg total) by mouth once daily. (Patient not taking: Reported on 4/27/2023)    meloxicam (MOBIC) 15 MG tablet Take 1 tablet (15 mg total) by mouth once daily. (Patient not taking: Reported on 4/27/2023)    methylPREDNISolone (MEDROL DOSEPACK) 4 mg tablet use as directed    microfibrillar collagen powder Apply 1 g topically as needed.    multivitamin (THERAGRAN) per tablet Take 1 tablet by mouth once daily.    sulfamethoxazole-trimethoprim 800-160mg (BACTRIM DS) 800-160 mg Tab Take 1 tablet by mouth 2 (two) times daily.    vitamin D (VITAMIN D3) 1000 units Tab Take 1,000 Units by mouth once daily.   Last reviewed on 4/27/2023  1:44 PM by Anna Amador MA    Review of patient's allergies indicates:  No Known Allergies Last reviewed on  6/1/2023 8:46 AM by Fly Bruno      Tasks added this encounter   No tasks added.   Tasks due within next 3 months   7/14/2023 - Clinical Assessment (1 year recurrence)  7/13/2023 - Refill Coordination Outreach (1 time occurrence)     [facility]  [address] [place] [address]  Phone: [phone]  Fax: [phone]

## 2023-08-10 ENCOUNTER — SPECIALTY PHARMACY (OUTPATIENT)
Dept: PHARMACY | Facility: CLINIC | Age: 43
End: 2023-08-10
Payer: COMMERCIAL

## 2023-08-10 NOTE — TELEPHONE ENCOUNTER
Specialty Pharmacy - Clinical Reassessment      Patient Diagnosis   L40.50 - Psoriatic arthritis    Awilda Mckeon is a 43 y.o. female, who is followed by the specialty pharmacy service for management and education of her Psoriatic arthritis.  She has been on therapy with Humira for 10 months.  I have reviewed her electronic medical record and current medication list and determined that specialty medication adjustment Is not needed at this time.    Patient has not experienced adverse events.  She Is adherent reporting 0 missed doses since last review.  Adherence has been encouraged with the following mechanism(s): Calendar.  She is meeting goals of therapy and will continue treatment.        7/13/2023 6/20/2023 5/17/2023 4/25/2023 3/28/2023 2/28/2023 1/31/2023   Follow Up Review   # of missed doses 0 0 0 0 0 0 0   New Medications? No Yes No No No No No   New Conditions? No No No No No No No   New Allergies? No No No No No No No   Med Effective? Very good Good Excellent Good Good Good Good   Urgent Care? No No No No No No No   Requested Pharmacist? No Yes No No No No No         Therapy is appropriate to continue.    Therapy is effective: Yes  On scale of 1 to 10, how does patient rank quality of life? (10 - Best): 10  Recommendations: none at this time.  Review Method: Patient Contact    Tasks added this encounter   No tasks added.   Tasks due within next 3 months   7/14/2023 - Clinical Assessment (1 year recurrence)  8/10/2023 - Refill Coordination Outreach (1 time occurrence)     Fly Trinh, PharmD  [facility]  [address]  [facility] [address]  Phone: [phone]  Fax: [phone]

## 2023-08-15 ENCOUNTER — SPECIALTY PHARMACY (OUTPATIENT)
Dept: PHARMACY | Facility: CLINIC | Age: 43
End: 2023-08-15
Payer: COMMERCIAL

## 2023-08-15 NOTE — TELEPHONE ENCOUNTER
Specialty Pharmacy - Refill Coordination    Specialty Medication Orders Linked to Encounter      Flowsheet Row Most Recent Value   Medication #1 adalimumab (HUMIRA,CF, PEN) 40 mg/0.4 mL PnKt (Order#[id number], Rx#[id number])            Refill Questions - Documented Responses      Flowsheet Row Most Recent Value   Patient Availability and HIPAA Verification    Does patient want to proceed with activity? Yes   HIPAA/medical authority confirmed? Yes   Relationship to patient of person spoken to? Self   Refill Screening Questions    Changes to allergies? No   Changes to medications? No   New conditions since last clinic visit? No   Unplanned office visit, urgent care, ED, or hospital admission in the last 4 weeks? No   How does patient/caregiver feel medication is working? Good   Financial problems or insurance changes? No   How many doses of your specialty medications were missed in the last 4 weeks? 0   Would patient like to speak to a pharmacist? No   When does the patient need to receive the medication? 08/22/23   Refill Delivery Questions    How will the patient receive the medication? MEDRx   When does the patient need to receive the medication? 08/22/23   Shipping Address Home   Address in [facility] confirmed and updated if neccessary? Yes   Expected Copay ($) 0   Is the patient able to afford the medication copay? Yes   Payment Method zero copay   Days supply of Refill 28   Supplies needed? No supplies needed   Refill activity completed? Yes   Refill activity plan Refill scheduled   Shipment/Pickup Date: 08/18/23            Current Outpatient Medications   Medication Sig    adalimumab (HUMIRA,CF, PEN) 40 mg/0.4 mL PnKt Inject 0.4 mL (40 mg total) into the skin every 14 (fourteen) days.    biotin 1 mg tablet Take 1,000 mcg by mouth 3 (three) times daily.    clobetasoL (TEMOVATE) 0.05 % cream Apply topically 2 (two) times daily.    ferrous sulfate (FEOSOL) 325 mg (65 mg iron) Tab tablet Take 325 mg by  mouth daily with breakfast.    Lactobac no.41/Bifidobact no.7 (PROBIOTIC-10 ORAL) Take 1 tablet by mouth once daily at 6am.    linaCLOtide (LINZESS) 72 mcg Cap capsule Take 1 capsule (72 mcg total) by mouth once daily. (Patient not taking: Reported on 4/27/2023)    meloxicam (MOBIC) 15 MG tablet Take 1 tablet (15 mg total) by mouth once daily. (Patient not taking: Reported on 4/27/2023)    methylPREDNISolone (MEDROL DOSEPACK) 4 mg tablet use as directed    microfibrillar collagen powder Apply 1 g topically as needed.    multivitamin (THERAGRAN) per tablet Take 1 tablet by mouth once daily.    sulfamethoxazole-trimethoprim 800-160mg (BACTRIM DS) 800-160 mg Tab Take 1 tablet by mouth 2 (two) times daily.    vitamin D (VITAMIN D3) 1000 units Tab Take 1,000 Units by mouth once daily.   Last reviewed on 4/27/2023  1:44 PM by Anna Amador MA    Review of patient's allergies indicates:  No Known Allergies Last reviewed on  6/1/2023 8:46 AM by Fly Bruno      Tasks added this encounter   No tasks added.   Tasks due within next 3 months   No tasks due.     [facility]  [address]  [facility] [address]  Phone: [phone]  Fax: [phone]

## 2023-09-11 DIAGNOSIS — L40.50 PSA (PSORIATIC ARTHRITIS): ICD-10-CM

## 2023-09-11 RX ORDER — ADALIMUMAB 40MG/0.4ML
40 KIT SUBCUTANEOUS
Qty: 2 PEN | Refills: 11 | Status: ACTIVE | OUTPATIENT
Start: 2023-09-11 | End: 2024-04-11

## 2023-10-20 ENCOUNTER — OFFICE VISIT (OUTPATIENT)
Dept: OBSTETRICS AND GYNECOLOGY | Facility: CLINIC | Age: 43
End: 2023-10-20
Payer: COMMERCIAL

## 2023-10-20 VITALS
BODY MASS INDEX: 24.59 KG/M2 | DIASTOLIC BLOOD PRESSURE: 74 MMHG | WEIGHT: 133.63 LBS | HEART RATE: 91 BPM | HEIGHT: 62 IN | SYSTOLIC BLOOD PRESSURE: 122 MMHG

## 2023-10-20 DIAGNOSIS — Z01.419 WELL WOMAN EXAM WITH ROUTINE GYNECOLOGICAL EXAM: Primary | ICD-10-CM

## 2023-10-20 DIAGNOSIS — Z30.017 ENCOUNTER FOR INITIAL PRESCRIPTION OF NEXPLANON: ICD-10-CM

## 2023-10-20 DIAGNOSIS — Z12.4 CERVICAL CANCER SCREENING: ICD-10-CM

## 2023-10-20 DIAGNOSIS — Z12.31 ENCOUNTER FOR SCREENING MAMMOGRAM FOR BREAST CANCER: ICD-10-CM

## 2023-10-20 DIAGNOSIS — N76.4 VULVAR ABSCESS: ICD-10-CM

## 2023-10-20 PROCEDURE — 3008F PR BODY MASS INDEX (BMI) DOCUMENTED: ICD-10-PCS | Mod: CPTII,S$GLB,, | Performed by: OBSTETRICS & GYNECOLOGY

## 2023-10-20 PROCEDURE — 3074F SYST BP LT 130 MM HG: CPT | Mod: CPTII,S$GLB,, | Performed by: OBSTETRICS & GYNECOLOGY

## 2023-10-20 PROCEDURE — 3008F BODY MASS INDEX DOCD: CPT | Mod: CPTII,S$GLB,, | Performed by: OBSTETRICS & GYNECOLOGY

## 2023-10-20 PROCEDURE — 88175 CYTOPATH C/V AUTO FLUID REDO: CPT | Performed by: PATHOLOGY

## 2023-10-20 PROCEDURE — 88141 CYTOPATH C/V INTERPRET: CPT | Mod: ,,, | Performed by: PATHOLOGY

## 2023-10-20 PROCEDURE — 3078F DIAST BP <80 MM HG: CPT | Mod: CPTII,S$GLB,, | Performed by: OBSTETRICS & GYNECOLOGY

## 2023-10-20 PROCEDURE — 3074F PR MOST RECENT SYSTOLIC BLOOD PRESSURE < 130 MM HG: ICD-10-PCS | Mod: CPTII,S$GLB,, | Performed by: OBSTETRICS & GYNECOLOGY

## 2023-10-20 PROCEDURE — 87624 HPV HI-RISK TYP POOLED RSLT: CPT | Performed by: OBSTETRICS & GYNECOLOGY

## 2023-10-20 PROCEDURE — 99396 PR PREVENTIVE VISIT,EST,40-64: ICD-10-PCS | Mod: S$GLB,,, | Performed by: OBSTETRICS & GYNECOLOGY

## 2023-10-20 PROCEDURE — 99999 PR PBB SHADOW E&M-EST. PATIENT-LVL III: CPT | Mod: PBBFAC,,, | Performed by: OBSTETRICS & GYNECOLOGY

## 2023-10-20 PROCEDURE — 99999 PR PBB SHADOW E&M-EST. PATIENT-LVL III: ICD-10-PCS | Mod: PBBFAC,,, | Performed by: OBSTETRICS & GYNECOLOGY

## 2023-10-20 PROCEDURE — 1159F PR MEDICATION LIST DOCUMENTED IN MEDICAL RECORD: ICD-10-PCS | Mod: CPTII,S$GLB,, | Performed by: OBSTETRICS & GYNECOLOGY

## 2023-10-20 PROCEDURE — 99396 PREV VISIT EST AGE 40-64: CPT | Mod: S$GLB,,, | Performed by: OBSTETRICS & GYNECOLOGY

## 2023-10-20 PROCEDURE — 3078F PR MOST RECENT DIASTOLIC BLOOD PRESSURE < 80 MM HG: ICD-10-PCS | Mod: CPTII,S$GLB,, | Performed by: OBSTETRICS & GYNECOLOGY

## 2023-10-20 PROCEDURE — 1159F MED LIST DOCD IN RCRD: CPT | Mod: CPTII,S$GLB,, | Performed by: OBSTETRICS & GYNECOLOGY

## 2023-10-20 PROCEDURE — 88141 PR  CYTOPATH CERV/VAG INTERPRET: ICD-10-PCS | Mod: ,,, | Performed by: PATHOLOGY

## 2023-10-20 RX ORDER — SULFAMETHOXAZOLE AND TRIMETHOPRIM 800; 160 MG/1; MG/1
1 TABLET ORAL 2 TIMES DAILY
Qty: 14 TABLET | Refills: 0 | Status: SHIPPED | OUTPATIENT
Start: 2023-10-20 | End: 2023-10-27

## 2023-10-20 RX ORDER — DOXYCYCLINE 100 MG/1
CAPSULE ORAL
COMMUNITY
Start: 2023-05-31 | End: 2023-10-20

## 2023-10-20 RX ORDER — FLUCONAZOLE 150 MG/1
150 TABLET ORAL ONCE
Qty: 1 TABLET | Refills: 0 | Status: SHIPPED | OUTPATIENT
Start: 2023-10-20 | End: 2023-10-21

## 2023-10-20 NOTE — PROGRESS NOTES
Subjective:    Patient ID: Awilda Mckeon is a 43 y.o. y.o. female.     Chief Complaint: Annual Well Woman Exam     History of Present Illness:  Awilda presents today for Annual Well Woman exam. She describes her menses as  absent .  She denies pelvic pain.  She denies breast tenderness, masses, nipple discharge. She denies GYN complaints except occ odor. She denies difficulty with urination or bowel movements. She denies bloating, early satiety, or weight changes. She is sexually active. Contraception is by no method.      Menstrual History:   No LMP recorded. Patient has had an ablation..     OB History    : 2  Para: 2  Term: 2  : 0  : 0  Spontaneous : 0  Induced : 0  Ectopic: 0            The following portions of the patient's history were reviewed and updated as appropriate: allergies, current medications, past family history, past medical history, past social history, past surgical history, and problem list.    ROS:   Review of Systems   Constitutional:  Positive for fatigue. Negative for chills, diaphoresis, fever and unexpected weight change.   HENT:  Negative for congestion, hearing loss, postnasal drip, rhinorrhea, sinus pressure, sinus pain, sore throat and tinnitus.    Eyes:  Negative for pain, discharge and visual disturbance.   Respiratory:  Negative for apnea, cough, shortness of breath and wheezing.    Cardiovascular:  Negative for chest pain, palpitations and leg swelling.   Gastrointestinal:  Positive for nausea. Negative for abdominal pain, constipation, diarrhea and vomiting.   Endocrine: Negative for cold intolerance, heat intolerance, polydipsia, polyphagia and polyuria.   Genitourinary:  Negative for difficulty urinating, dyspareunia, dysuria, enuresis, flank pain, frequency, genital sores, hematuria, menstrual problem, pelvic pain, urgency, vaginal bleeding, vaginal discharge and vaginal pain.   Musculoskeletal:  Negative for arthralgias, back  pain, joint swelling, myalgias, neck pain and neck stiffness.   Skin:  Negative for color change, pallor and rash.   Allergic/Immunologic: Negative for environmental allergies, food allergies and immunocompromised state.   Neurological:  Negative for dizziness, weakness, light-headedness, numbness and headaches.   Hematological:  Negative for adenopathy. Does not bruise/bleed easily.   Psychiatric/Behavioral:  Negative for agitation and confusion. The patient is not nervous/anxious.          Objective:    Vital Signs:  Vitals:    10/20/23 1354   BP: 122/74   Pulse: 91       Physical Exam:  General:  alert, cooperative, no distress   Skin:  Skin color, texture, turgor normal. No rashes or lesions   HEENT:  extra ocular movement intact, sclera clear, anicteric   Neck: supple, trachea midline, no adenopathy or thyromegally   Respiratory:  Normal effort   Breasts:  no discharge, erythema, tenderness, or palpable masses; no axillary lymphadenopathy   Abdomen:  soft, nontender, no palpable masses   Pelvis: External genitalia: normal general appearance, multiple small abscess lesions    Urinary system: urethral meatus normal, bladder nontender  Vaginal: normal mucosa without prolapse or lesions, discharge, white  Cervix: normal appearance  Uterus: normal size, shape, position  Adnexa: normal size, nontender bilaterally   Extremities: Normal ROM; no edema, no cyanosis   Neurologial: Normal strength and tone. No focal numbness or weakness.   Psychiatric: normal mood, speech, dress, and thought processes         Assessment:       Healthy female exam.     1. Well woman exam with routine gynecological exam    2. Cervical cancer screening    3. Encounter for screening mammogram for breast cancer    4. Vulvar abscess    5. Encounter for initial prescription of Nexplanon          Plan:      Well woman exam with routine gynecological exam    Cervical cancer screening  -     Liquid-Based Pap Smear, Screening  -     HPV High Risk  Genotypes, PCR    Encounter for screening mammogram for breast cancer  -     Mammo Digital Screening Bilat w/ Madhu; Future; Expected date: 10/20/2023    Vulvar abscess  -     sulfamethoxazole-trimethoprim 800-160mg (BACTRIM DS) 800-160 mg Tab; Take 1 tablet by mouth 2 (two) times daily. for 7 days  Dispense: 14 tablet; Refill: 0    Encounter for initial prescription of Nexplanon  -     Device Authorization Order    Other orders  -     fluconazole (DIFLUCAN) 150 MG Tab; Take 1 tablet (150 mg total) by mouth once. for 1 dose  Dispense: 1 tablet; Refill: 0        Pt currently not using birth control with her new partner and has a h/o an ablation.  Will plan for Nexplanon.      COUNSELING:  Awilda was counseled on STD pevention, use and side-effects of various contraceptive measures, A.C.O.G. Pap guidelines and recommendations for yearly pelvic exams in addition to recommendations for monthly self breast exams; to see her PCP for other health maintenance.

## 2023-10-26 LAB
HPV HR 12 DNA SPEC QL NAA+PROBE: NEGATIVE
HPV16 AG SPEC QL: POSITIVE
HPV18 DNA SPEC QL NAA+PROBE: NEGATIVE

## 2023-10-30 ENCOUNTER — OFFICE VISIT (OUTPATIENT)
Dept: RHEUMATOLOGY | Facility: CLINIC | Age: 43
End: 2023-10-30
Payer: COMMERCIAL

## 2023-10-30 VITALS
SYSTOLIC BLOOD PRESSURE: 135 MMHG | BODY MASS INDEX: 24.22 KG/M2 | WEIGHT: 131.63 LBS | DIASTOLIC BLOOD PRESSURE: 92 MMHG | HEART RATE: 77 BPM | HEIGHT: 62 IN

## 2023-10-30 DIAGNOSIS — D84.821 DRUG-INDUCED IMMUNODEFICIENCY: ICD-10-CM

## 2023-10-30 DIAGNOSIS — L02.224 BOIL OF GROIN: ICD-10-CM

## 2023-10-30 DIAGNOSIS — L40.50 PSORIATIC ARTHRITIS: Primary | ICD-10-CM

## 2023-10-30 DIAGNOSIS — Z79.899 DRUG-INDUCED IMMUNODEFICIENCY: ICD-10-CM

## 2023-10-30 LAB
FINAL PATHOLOGIC DIAGNOSIS: ABNORMAL
Lab: ABNORMAL

## 2023-10-30 PROCEDURE — 99999 PR PBB SHADOW E&M-EST. PATIENT-LVL IV: CPT | Mod: PBBFAC,,, | Performed by: STUDENT IN AN ORGANIZED HEALTH CARE EDUCATION/TRAINING PROGRAM

## 2023-10-30 PROCEDURE — 3075F SYST BP GE 130 - 139MM HG: CPT | Mod: CPTII,S$GLB,, | Performed by: STUDENT IN AN ORGANIZED HEALTH CARE EDUCATION/TRAINING PROGRAM

## 2023-10-30 PROCEDURE — 99214 OFFICE O/P EST MOD 30 MIN: CPT | Mod: S$GLB,,, | Performed by: STUDENT IN AN ORGANIZED HEALTH CARE EDUCATION/TRAINING PROGRAM

## 2023-10-30 PROCEDURE — 99999 PR PBB SHADOW E&M-EST. PATIENT-LVL IV: ICD-10-PCS | Mod: PBBFAC,,, | Performed by: STUDENT IN AN ORGANIZED HEALTH CARE EDUCATION/TRAINING PROGRAM

## 2023-10-30 PROCEDURE — 3080F DIAST BP >= 90 MM HG: CPT | Mod: CPTII,S$GLB,, | Performed by: STUDENT IN AN ORGANIZED HEALTH CARE EDUCATION/TRAINING PROGRAM

## 2023-10-30 PROCEDURE — 1159F MED LIST DOCD IN RCRD: CPT | Mod: CPTII,S$GLB,, | Performed by: STUDENT IN AN ORGANIZED HEALTH CARE EDUCATION/TRAINING PROGRAM

## 2023-10-30 PROCEDURE — 1159F PR MEDICATION LIST DOCUMENTED IN MEDICAL RECORD: ICD-10-PCS | Mod: CPTII,S$GLB,, | Performed by: STUDENT IN AN ORGANIZED HEALTH CARE EDUCATION/TRAINING PROGRAM

## 2023-10-30 PROCEDURE — 3008F PR BODY MASS INDEX (BMI) DOCUMENTED: ICD-10-PCS | Mod: CPTII,S$GLB,, | Performed by: STUDENT IN AN ORGANIZED HEALTH CARE EDUCATION/TRAINING PROGRAM

## 2023-10-30 PROCEDURE — 3075F PR MOST RECENT SYSTOLIC BLOOD PRESS GE 130-139MM HG: ICD-10-PCS | Mod: CPTII,S$GLB,, | Performed by: STUDENT IN AN ORGANIZED HEALTH CARE EDUCATION/TRAINING PROGRAM

## 2023-10-30 PROCEDURE — 3080F PR MOST RECENT DIASTOLIC BLOOD PRESSURE >= 90 MM HG: ICD-10-PCS | Mod: CPTII,S$GLB,, | Performed by: STUDENT IN AN ORGANIZED HEALTH CARE EDUCATION/TRAINING PROGRAM

## 2023-10-30 PROCEDURE — 3008F BODY MASS INDEX DOCD: CPT | Mod: CPTII,S$GLB,, | Performed by: STUDENT IN AN ORGANIZED HEALTH CARE EDUCATION/TRAINING PROGRAM

## 2023-10-30 PROCEDURE — 99214 PR OFFICE/OUTPT VISIT, EST, LEVL IV, 30-39 MIN: ICD-10-PCS | Mod: S$GLB,,, | Performed by: STUDENT IN AN ORGANIZED HEALTH CARE EDUCATION/TRAINING PROGRAM

## 2023-10-30 RX ORDER — CLINDAMYCIN PHOSPHATE 11.9 MG/ML
SOLUTION TOPICAL 2 TIMES DAILY
Qty: 30 ML | Refills: 3 | Status: SHIPPED | OUTPATIENT
Start: 2023-10-30

## 2023-10-30 RX ORDER — MUPIROCIN 20 MG/G
OINTMENT TOPICAL 3 TIMES DAILY
Qty: 22 G | Refills: 3 | Status: SHIPPED | OUTPATIENT
Start: 2023-10-30 | End: 2023-10-30 | Stop reason: CLARIF

## 2023-10-30 NOTE — PATIENT INSTRUCTIONS
Do labs today   Then every 6 months  Skip tomorrow's dose of Humira and resume in 2 weeks.   Referral to dermatology   Use mupirocin ointment in affected three times a day for the next 7 days.   No shaving

## 2023-10-30 NOTE — PROGRESS NOTES
RHEUMATOLOGY OUTPATIENT CLINIC NOTE    10/30/2023    Attending Rheumatologist: Daisha Luong  Primary Care Provider: Jenn Briones DO   Physician Requesting Consultation: No referring provider defined for this encounter.  Chief Complaint/Reason For Consultation:  Joint Pain and Establish Care        Subjective:       HPI  Awilda Mckeon is a 43 y.o. White female with medical history noted below who presents for evaluation of PsA.    She is new to me. Used to follow with Dr. Bruno at [place]. Last visit on 4/2023     She notes being diagnosed at the age of 34 with PsA, PsO started in high school. MCP swelling, dactylitis, rash, +FHX. She has tried MTX and SSZ, suffered from Nausea, but did note improvement. Patient endorses worse joints to be hands, knees, feet, elbow, wrist. +Swelling, Dactylitis. Morning stiffness lasting hours. Had baker cyst rupture with left leg swelling in June has improved. +Bloody stools (Hx of hemorrhoids), Genital PsO, Nail changes, +FHX, Night sweats, fatigue. No EYE inflammation, IBP, Devi, wt loss.   LEF caused changes in her mood  Humira started a year ago    Today  Patient here for follow up.   Humira has helped significantly in her joints and psoriasis. She takes a benadryl 30 min before Humira inj. She also developed a generalized rash after 1st injection of Humira which has not recurred.  Around May 2023, she developed rash in between groin with pus. She was given bactrim at the time for it.   She reports that her rash has not resolved entirely. Now having boils that occasionally drain blood or pus. Saw her gynecologist last week and was given bactrim which she completed last week and did not help. She noted that draining has gotten worse.  She has not held humira every since.     Review of Systems   Constitutional:  Negative for chills, fatigue, fever and unexpected weight change.   HENT:  Negative for mouth sores and trouble swallowing.    Eyes:  Negative for  redness and eye dryness.   Respiratory:  Negative for cough and shortness of breath.    Cardiovascular:  Negative for chest pain.   Gastrointestinal:  Negative for abdominal distention, constipation, diarrhea, nausea and vomiting.   Genitourinary:  Negative for dysuria, genital sores and vaginal dryness.   Musculoskeletal:  Negative for arthralgias, back pain, gait problem, joint swelling, leg pain, myalgias, neck pain, neck stiffness and joint deformity.   Integumentary:  Positive for rash.   Neurological:  Negative for weakness, numbness and headaches.   Hematological:  Negative for adenopathy. Does not bruise/bleed easily.   Psychiatric/Behavioral:  Negative for confusion, decreased concentration and sleep disturbance. The patient is not nervous/anxious.    All other systems reviewed and are negative.       Chronic comorbid conditions affecting medical decision making today:  Past Medical History:   Diagnosis Date    Abnormal Pap smear of cervix years ago    cyro done, nl since    Abnormal uterine bleeding (AUB) 10/23/2019    Arthritis     Menorrhagia 2019    Menorrhagia with regular cycle 2019    - followed by Gynecology and plan for endometrial ablation    Mitral valve prolapse     Pregnancy         Psoriasis      Past Surgical History:   Procedure Laterality Date    COLONOSCOPY N/A 6/3/2021    Procedure: COLONOSCOPY;  Surgeon: Alexa Palacios MD;  Location: [facility];  Service: Endoscopy;  Laterality: N/A;    COLPOSCOPY      remote    DILATION AND CURETTAGE OF UTERUS N/A 10/4/2019    Procedure: DILATION AND CURETTAGE, UTERUS;  Surgeon: Karlene Chavez MD;  Location: [facility];  Service: OB/GYN;  Laterality: N/A;    ENDOMETRIAL ABLATION      RHINOPLASTY      THERMAL ABLATION OF ENDOMETRIUM USING HYSTEROSCOPY N/A 10/4/2019    Procedure: ABLATION, ENDOMETRIUM, THERMAL, HYSTEROSCOPIC;  Surgeon: Karlene Chavez MD;  Location: [facility];  Service: OB/GYN;  Laterality: N/A;    TONSILLECTOMY       TYMPANOSTOMY TUBE PLACEMENT       Family History   Problem Relation Age of Onset    Hypertension Mother     Hyperlipidemia Father     No Known Problems Sister     Epilepsy Daughter     No Known Problems Son     No Known Problems Maternal Grandmother     No Known Problems Maternal Grandfather     No Known Problems Paternal Grandmother     No Known Problems Paternal Grandfather     Breast cancer Neg Hx     Colon cancer Neg Hx     Ovarian cancer Neg Hx      Social History     Substance and Sexual Activity   Alcohol Use Yes    Alcohol/week: 2.0 standard drinks of alcohol    Types: 1 Cans of beer, 1 Shots of liquor per week    Comment: socially     Social History     Tobacco Use   Smoking Status Some Days    Current packs/day: 0.00    Average packs/day: 0.5 packs/day for 20.0 years (10.0 ttl pk-yrs)    Types: Cigarettes, Vaping with nicotine    Start date: 2002    Last attempt to quit: 2022    Years since quittin.6   Smokeless Tobacco Never     Social History     Substance and Sexual Activity   Drug Use No       Current Outpatient Medications:     adalimumab (HUMIRA,CF, PEN) 40 mg/0.4 mL PnKt, Inject 0.4 mL (40 mg total) into the skin every 14 (fourteen) days., Disp: 2 pen , Rfl: 11    biotin 1 mg tablet, Take 1,000 mcg by mouth 3 (three) times daily., Disp: , Rfl:     clobetasoL (TEMOVATE) 0.05 % cream, Apply topically 2 (two) times daily., Disp: 45 g, Rfl: 1    ferrous sulfate (FEOSOL) 325 mg (65 mg iron) Tab tablet, Take 325 mg by mouth daily with breakfast., Disp: , Rfl:     Lactobac no.41/Bifidobact no.7 (PROBIOTIC-10 ORAL), Take 1 tablet by mouth once daily at 6am., Disp: , Rfl:     linaCLOtide (LINZESS) 72 mcg Cap capsule, Take 1 capsule (72 mcg total) by mouth once daily., Disp: 90 capsule, Rfl: 3    microfibrillar collagen powder, Apply 1 g topically as needed., Disp: , Rfl:     multivitamin (THERAGRAN) per tablet, Take 1 tablet by mouth once daily., Disp: , Rfl:     vitamin D (VITAMIN D3) 1000 units  "Tab, Take 1,000 Units by mouth once daily., Disp: , Rfl:     mupirocin (BACTROBAN) 2 % ointment, Apply topically 3 (three) times daily., Disp: 22 g, Rfl: 3     Objective:         Vitals:    10/30/23 1006   BP: (!) 135/92   Pulse: 77     Physical Exam  Can make fist, no synovitis   Knee crepitus  Negative ankle/MTP  Lumbar extension/flexion ok  Skin: left groin with 1 cm boil, not actively draining, painful to touch. Hyperpigmentation noted in the groin too. Right side with induration but no active lesions.     Reviewed old and all outside pertinent medical records available.    All lab results personally reviewed and interpreted by me.  Lab Results   Component Value Date    WBC 6.05 01/19/2023    HGB 14.2 01/19/2023    HCT 43.5 01/19/2023    MCV 88 01/19/2023    MCH 28.7 01/19/2023    MCHC 32.6 01/19/2023    RDW 12.4 01/19/2023     01/19/2023    MPV 10.7 01/19/2023       Lab Results   Component Value Date     01/19/2023    K 4.0 01/19/2023     01/19/2023    CO2 25 01/19/2023     01/19/2023    BUN 11 01/19/2023    CALCIUM 9.8 01/19/2023    PROT 7.2 01/19/2023    ALBUMIN 4.4 01/19/2023    BILITOT 0.7 01/19/2023    AST 15 01/19/2023    ALKPHOS 47 (L) 01/19/2023    ALT 10 01/19/2023       Lab Results   Component Value Date    COLORU Yellow 09/22/2022    APPEARANCEUA Hazy (A) 10/08/2021    SPECGRAV 1.005 09/22/2022    PHUR 7 09/22/2022    PROTEINUA Negative 10/08/2021    KETONESU neg 09/22/2022    LEUKOCYTESUR Negative 10/08/2021    NITRITE neg 09/22/2022    UROBILINOGEN neg 09/22/2022       Lab Results   Component Value Date    CRP 0.4 01/19/2023       Lab Results   Component Value Date    SEDRATE <2 01/19/2023       Lab Results   Component Value Date    RF 47.0 (H) 08/18/2022    SEDRATE <2 01/19/2023       No components found for: "25OHVITDTOT", "96OPUXOH9", "04NYZWLU8", "METHODNOTE"    Lab Results   Component Value Date    URICACID 4.7 08/18/2022       No components found for: " ""TSPOTTB"        Imaging:  All imaging reviewed and independently interpreted by me.         ASSESSMENT / PLAN:     Awilda Mckeon is a 43 y.o. White female with:      1. Psoriatic arthritis  - patient with PsA, +synovitis, dactylitis  - doing well   - continue Humira for now. She will skip tomorrow's dose given boil in left groin. Can consider rizankizumab in the future   - hands ok today  - reassurance    2. Boils in groin  - she has been having intermittent boils in both groin areas. Today has one on the left side, indurated, no purulent discharge today, painful to palpation. Completed 7 day course of bactrim per gynecologist last week and discharge has worsen  - infection (side effect from Humira) vs. Mild hydradenitis suppurative  - start clindamycin solution in affected areas twice a day  - avoid shaving  - referral to dermatology for input      2. Encounter for long-term (current) use of other medications  - monitor labs now and then every 6 months   - Pregnancy Counseling (she had ablation)   - no live vaccines  - vaccines per guidelines  - immunosuppression/infectious precautions discussed     3. Other specified counseling  - over 10 minutes spent regarding below topics:  - Immunization counseling done.  - Weight loss counseling done.  - Nutrition and exercise counseling.  - Limitation of alcohol consumption.  - Regular exercise:  Aerobic and resistance.  - Medication counseling provided.    Follow up in about 2 months (around 12/30/2023).    Method of contact with patient concerns: [facility]    Disclaimer:  This note is prepared using voice recognition software and as such is likely to have errors and has not been proof read. Please contact me for questions.     Time spent: 30 minutes in face to face discussion concerning diagnosis, prognosis, review of lab and test results, benefits of treatment as well as management of disease, counseling of patient and coordination of care between " various health care providers.  Greater than half the time spent was used for coordination of care and counseling of patient.    Daisha Luong M.D.  Rheumatology Department   [facility]

## 2023-11-01 ENCOUNTER — PATIENT MESSAGE (OUTPATIENT)
Dept: PRIMARY CARE CLINIC | Facility: CLINIC | Age: 43
End: 2023-11-01
Payer: COMMERCIAL

## 2023-11-10 ENCOUNTER — PROCEDURE VISIT (OUTPATIENT)
Dept: OBSTETRICS AND GYNECOLOGY | Facility: CLINIC | Age: 43
End: 2023-11-10
Payer: COMMERCIAL

## 2023-11-10 VITALS
HEIGHT: 62 IN | DIASTOLIC BLOOD PRESSURE: 76 MMHG | SYSTOLIC BLOOD PRESSURE: 114 MMHG | HEART RATE: 91 BPM | BODY MASS INDEX: 24.54 KG/M2 | WEIGHT: 133.38 LBS

## 2023-11-10 DIAGNOSIS — Z01.812 PRE-PROCEDURE LAB EXAM: ICD-10-CM

## 2023-11-10 DIAGNOSIS — Z30.017 NEXPLANON INSERTION: ICD-10-CM

## 2023-11-10 DIAGNOSIS — R87.612 LGSIL ON PAP SMEAR OF CERVIX: Primary | ICD-10-CM

## 2023-11-10 LAB
B-HCG UR QL: NEGATIVE
CTP QC/QA: YES

## 2023-11-10 PROCEDURE — 11981 INSERTION OF NEXPLANON: ICD-10-PCS | Mod: S$GLB,,, | Performed by: OBSTETRICS & GYNECOLOGY

## 2023-11-10 PROCEDURE — 88305 TISSUE EXAM BY PATHOLOGIST: CPT | Performed by: PATHOLOGY

## 2023-11-10 PROCEDURE — 88305 TISSUE EXAM BY PATHOLOGIST: ICD-10-PCS | Mod: 26,,, | Performed by: PATHOLOGY

## 2023-11-10 PROCEDURE — 88305 TISSUE EXAM BY PATHOLOGIST: CPT | Mod: 26,,, | Performed by: PATHOLOGY

## 2023-11-10 PROCEDURE — 81025 POCT URINE PREGNANCY: ICD-10-PCS | Mod: S$GLB,,, | Performed by: OBSTETRICS & GYNECOLOGY

## 2023-11-10 PROCEDURE — 81025 URINE PREGNANCY TEST: CPT | Mod: S$GLB,,, | Performed by: OBSTETRICS & GYNECOLOGY

## 2023-11-10 PROCEDURE — 11981 INSERTION DRUG DLVR IMPLANT: CPT | Mod: S$GLB,,, | Performed by: OBSTETRICS & GYNECOLOGY

## 2023-11-10 NOTE — PROCEDURES
Insertion of Nexplanon    Date/Time: 11/10/2023 12:45 PM    Performed by: Karlene Chavez MD  Authorized by: Karlene Chavez MD    Consent obtained:  Written  Consent given by:  Patient  Patient questions answered: yes    Patient agrees, verbalizes understanding, and wants to proceed: yes    Educational handouts given: yes    Instructions and paperwork completed: yes    Pre-procedure timeout performed: yes    Local anesthetic:  Lidocaine with epinephrine  Left/right:  Left   68 mg etonogestreL 68 mg  Preloaded Implanon trocar was placed subdermally: yes    Visualization of implant was obtained: yes    Nexplanon was inserted and trocar removed: yes    Visualization of notch in stilette and palpitation of device: yes    Palpitation confirms placement by provider and patient: yes    Site was closed with steri-strips and pressure bandage applied: yes

## 2023-11-10 NOTE — PROCEDURES
Colposcopy    Date/Time: 11/10/2023 12:45 PM    Performed by: Karlene Chavez MD  Authorized by: Karlene Chavez MD    Consent Done?:  Yes (Written)  Timeout:Immediately prior to procedure a time out was called to verify the correct patient, procedure, equipment, support staff and site/side marked as required    Colposcopy Site:  Cervix and Vagina  Acrowhite Lesion? Yes    Atypical Vessels: No    Transformation Zone Adequate?: Yes    Biopsy?: Yes         Location:  Cervix ((4 00 and 9 00))  ECC Performed?: Yes    Estimated blood loss (cc):  2   Patient tolerated the procedure well with no immediate complications.   Post-operative instructions were provided for the patient.   Patient was discharged and will follow up if any complications occur

## 2023-11-15 LAB
FINAL PATHOLOGIC DIAGNOSIS: NORMAL
GROSS: NORMAL
Lab: NORMAL

## 2024-01-04 ENCOUNTER — OFFICE VISIT (OUTPATIENT)
Dept: RHEUMATOLOGY | Facility: CLINIC | Age: 44
End: 2024-01-04
Payer: COMMERCIAL

## 2024-01-04 VITALS
HEART RATE: 83 BPM | HEIGHT: 62 IN | WEIGHT: 133.19 LBS | SYSTOLIC BLOOD PRESSURE: 125 MMHG | BODY MASS INDEX: 24.51 KG/M2 | DIASTOLIC BLOOD PRESSURE: 83 MMHG

## 2024-01-04 DIAGNOSIS — Z79.899 DRUG-INDUCED IMMUNODEFICIENCY: ICD-10-CM

## 2024-01-04 DIAGNOSIS — L40.50 PSORIATIC ARTHRITIS: Primary | ICD-10-CM

## 2024-01-04 DIAGNOSIS — Z71.89 COUNSELING AND COORDINATION OF CARE: ICD-10-CM

## 2024-01-04 DIAGNOSIS — L02.224 BOIL OF GROIN: ICD-10-CM

## 2024-01-04 DIAGNOSIS — D84.821 DRUG-INDUCED IMMUNODEFICIENCY: ICD-10-CM

## 2024-01-04 PROCEDURE — 99999 PR PBB SHADOW E&M-EST. PATIENT-LVL IV: CPT | Mod: PBBFAC,,, | Performed by: STUDENT IN AN ORGANIZED HEALTH CARE EDUCATION/TRAINING PROGRAM

## 2024-01-04 PROCEDURE — 3074F SYST BP LT 130 MM HG: CPT | Mod: CPTII,S$GLB,, | Performed by: STUDENT IN AN ORGANIZED HEALTH CARE EDUCATION/TRAINING PROGRAM

## 2024-01-04 PROCEDURE — 1159F MED LIST DOCD IN RCRD: CPT | Mod: CPTII,S$GLB,, | Performed by: STUDENT IN AN ORGANIZED HEALTH CARE EDUCATION/TRAINING PROGRAM

## 2024-01-04 PROCEDURE — 99214 OFFICE O/P EST MOD 30 MIN: CPT | Mod: S$GLB,,, | Performed by: STUDENT IN AN ORGANIZED HEALTH CARE EDUCATION/TRAINING PROGRAM

## 2024-01-04 PROCEDURE — 3008F BODY MASS INDEX DOCD: CPT | Mod: CPTII,S$GLB,, | Performed by: STUDENT IN AN ORGANIZED HEALTH CARE EDUCATION/TRAINING PROGRAM

## 2024-01-04 PROCEDURE — 3079F DIAST BP 80-89 MM HG: CPT | Mod: CPTII,S$GLB,, | Performed by: STUDENT IN AN ORGANIZED HEALTH CARE EDUCATION/TRAINING PROGRAM

## 2024-01-04 NOTE — PATIENT INSTRUCTIONS
Do labs prior to next visit   Follow up in 3 months   Ask your dermatologist to send us the records.   Continue humira every 2 weeks    Patient presents for GI follow-up for MC/LC, Lymphocytic colitis. Seen 8 weeks ago with flare of diarrhea and Entocort 9mg daily started.  History of lymphocytic colitis of a younger age.  Diagnostic work-up has included a colonoscopy in 2018 with findings confirmatory for lymphocytic microscopic colitis.   Prior she was doing well on Entocort and Questran powder. She did great with Entocort last time and stopped Rx. For over a year BM normal on Questran powder once daily. She is 100% better on Entocort now. One formed BM daily.

## 2024-01-04 NOTE — PROGRESS NOTES
RHEUMATOLOGY OUTPATIENT CLINIC NOTE    1/4/2024    Attending Rheumatologist: Daisha Luong  Primary Care Provider: Jenn Briones DO   Physician Requesting Consultation: No referring provider defined for this encounter.  Chief Complaint/Reason For Consultation:  Psoriatic Arthritis (/)        Subjective:       HPI  Awilda Mckeon is a 43 y.o. White female with medical history noted below who presents for evaluation of PsA.    Interim history    -Initial consult with me on 10/2023.   -PsA has been doing great with Humira  -Boil on left groin is persistent despite multiple short courses of antibiotics and topical clindamycin. She held Humira for one cycle and it did not make a difference. Saw dermatology outside [facility] (no records available) and was told it is acne. Currently taking doxycycline for the last month, has not made any difference. Will see derm again today.   -labs from 10/2023 showed normal CBC, CMP, ESR, CRP. Negative quantiferon and hepatitis B and C serologies.     Disease history     Used to follow with Dr. Bruno at [place]. Last visit on 4/2023   She notes being diagnosed at the age of 34 with PsA, PsO started in high school. MCP swelling, dactylitis, rash, +FHX. She has tried MTX and SSZ, suffered from Nausea, but did note improvement. Patient endorses worse joints to be hands, knees, feet, elbow, wrist. +Swelling, Dactylitis. Morning stiffness lasting hours. Had baker cyst rupture with left leg swelling in June has improved. +Bloody stools (Hx of hemorrhoids), Genital PsO, Nail changes, +FHX, Night sweats, fatigue. No EYE inflammation, IBP, Devi, wt loss.   LEF caused changes in her mood  Humira started a year ago      Review of Systems   Constitutional:  Negative for chills, fatigue, fever and unexpected weight change.   HENT:  Negative for mouth sores and trouble swallowing.    Eyes:  Negative for redness and eye dryness.   Respiratory:  Negative for cough and shortness of  breath.    Cardiovascular:  Negative for chest pain.   Gastrointestinal:  Negative for abdominal distention, constipation, diarrhea, nausea and vomiting.   Genitourinary:  Negative for dysuria, genital sores and vaginal dryness.   Musculoskeletal:  Negative for arthralgias, back pain, gait problem, joint swelling, leg pain, myalgias, neck pain, neck stiffness and joint deformity.   Integumentary:  Positive for rash.   Neurological:  Negative for weakness, numbness and headaches.   Hematological:  Negative for adenopathy. Does not bruise/bleed easily.   Psychiatric/Behavioral:  Negative for confusion, decreased concentration and sleep disturbance. The patient is not nervous/anxious.    All other systems reviewed and are negative.       Chronic comorbid conditions affecting medical decision making today:  Past Medical History:   Diagnosis Date    Abnormal Pap smear of cervix     cyro done, nl since years ago,  LGSIL + HPV 16 COLPO DONE.    Abnormal uterine bleeding (AUB) 10/23/2019    Arthritis     Menorrhagia 2019    Menorrhagia with regular cycle 2019    - followed by Gynecology and plan for endometrial ablation    Mitral valve prolapse     Pregnancy         Psoriasis      Past Surgical History:   Procedure Laterality Date    COLONOSCOPY N/A 6/3/2021    Procedure: COLONOSCOPY;  Surgeon: lAexa Palacios MD;  Location: [facility];  Service: Endoscopy;  Laterality: N/A;    COLPOSCOPY      remote    DILATION AND CURETTAGE OF UTERUS N/A 10/4/2019    Procedure: DILATION AND CURETTAGE, UTERUS;  Surgeon: Karlene Chavez MD;  Location: [facility];  Service: OB/GYN;  Laterality: N/A;    ENDOMETRIAL ABLATION      RHINOPLASTY      THERMAL ABLATION OF ENDOMETRIUM USING HYSTEROSCOPY N/A 10/4/2019    Procedure: ABLATION, ENDOMETRIUM, THERMAL, HYSTEROSCOPIC;  Surgeon: Karlene Chavez MD;  Location: [facility];  Service: OB/GYN;  Laterality: N/A;    TONSILLECTOMY      TYMPANOSTOMY TUBE PLACEMENT       Family  History   Problem Relation Age of Onset    Hypertension Mother     Hyperlipidemia Father     No Known Problems Sister     Epilepsy Daughter     No Known Problems Son     No Known Problems Maternal Grandmother     No Known Problems Maternal Grandfather     No Known Problems Paternal Grandmother     No Known Problems Paternal Grandfather     Breast cancer Neg Hx     Colon cancer Neg Hx     Ovarian cancer Neg Hx      Social History     Substance and Sexual Activity   Alcohol Use Yes    Alcohol/week: 2.0 standard drinks of alcohol    Types: 1 Cans of beer, 1 Shots of liquor per week    Comment: socially     Social History     Tobacco Use   Smoking Status Some Days    Current packs/day: 0.00    Average packs/day: 0.5 packs/day for 20.0 years (10.0 ttl pk-yrs)    Types: Cigarettes, Vaping with nicotine    Start date: 2002    Last attempt to quit: 2022    Years since quittin.8   Smokeless Tobacco Never     Social History     Substance and Sexual Activity   Drug Use No       Current Outpatient Medications:     adalimumab (HUMIRA,CF, PEN) 40 mg/0.4 mL PnKt, Inject 0.4 mL (40 mg total) into the skin every 14 (fourteen) days., Disp: 2 pen , Rfl: 11    biotin 1 mg tablet, Take 1,000 mcg by mouth 3 (three) times daily., Disp: , Rfl:     clindamycin (CLEOCIN T) 1 % external solution, Apply topically to affected area(s) 2 (two) times daily., Disp: 30 mL, Rfl: 3    clindamycin phosphate 1% (CLINDAGEL) 1 % gel, Use one application topically twice daily., Disp: 60 g, Rfl: 1    clobetasoL (TEMOVATE) 0.05 % cream, Apply topically 2 (two) times daily., Disp: 45 g, Rfl: 1    doxycycline (VIBRA-TABS) 100 MG tablet, Take 1 capsule by mouth every day, Disp: 30 tablet, Rfl: 0    ferrous sulfate (FEOSOL) 325 mg (65 mg iron) Tab tablet, Take 325 mg by mouth daily with breakfast., Disp: , Rfl:     Lactobac no.41/Bifidobact no.7 (PROBIOTIC-10 ORAL), Take 1 tablet by mouth once daily at 6am., Disp: , Rfl:     linaCLOtide (LINZESS) 72  mcg Cap capsule, Take 1 capsule (72 mcg total) by mouth once daily., Disp: 90 capsule, Rfl: 3    microfibrillar collagen powder, Apply 1 g topically as needed., Disp: , Rfl:     multivitamin (THERAGRAN) per tablet, Take 1 tablet by mouth once daily., Disp: , Rfl:     vitamin D (VITAMIN D3) 1000 units Tab, Take 1,000 Units by mouth once daily., Disp: , Rfl:     Current Facility-Administered Medications:     etonogestreL subdermal device 68 mg, 68 mg, Implant, , Karlene Chavez MD, 68 mg at 11/10/23 1245     Objective:         Vitals:    01/04/24 1106   BP: 125/83   Pulse: 83     Physical Exam  Can make fist, no synovitis   Knee crepitus  Negative ankle/MTP  Lumbar extension/flexion ok  Skin: left groin with 1 cm boil, not actively draining, painful to touch. Hyperpigmentation noted in the groin too. Right side with induration but no active lesions.     Reviewed old and all outside pertinent medical records available.    All lab results personally reviewed and interpreted by me.  Lab Results   Component Value Date    WBC 6.15 10/30/2023    WBC 6.15 10/30/2023    HGB 15.0 10/30/2023    HGB 15.0 10/30/2023    HCT 43.6 10/30/2023    HCT 43.6 10/30/2023    MCV 87 10/30/2023    MCV 87 10/30/2023    MCH 30.0 10/30/2023    MCH 30.0 10/30/2023    MCHC 34.4 10/30/2023    MCHC 34.4 10/30/2023    RDW 12.1 10/30/2023    RDW 12.1 10/30/2023     10/30/2023     10/30/2023    MPV 9.7 10/30/2023    MPV 9.7 10/30/2023       Lab Results   Component Value Date     10/30/2023     10/30/2023    K 3.7 10/30/2023    K 3.7 10/30/2023     10/30/2023     10/30/2023    CO2 27 10/30/2023    CO2 27 10/30/2023    GLU 89 10/30/2023    GLU 89 10/30/2023    BUN 16 10/30/2023    BUN 16 10/30/2023    CALCIUM 9.0 10/30/2023    CALCIUM 9.0 10/30/2023    PROT 7.9 10/30/2023    PROT 7.9 10/30/2023    ALBUMIN 4.7 10/30/2023    ALBUMIN 4.7 10/30/2023    BILITOT 0.7 10/30/2023    BILITOT 0.7 10/30/2023    AST 22  "10/30/2023    AST 22 10/30/2023    ALKPHOS 57 10/30/2023    ALKPHOS 57 10/30/2023    ALT 15 10/30/2023    ALT 15 10/30/2023       Lab Results   Component Value Date    COLORU Yellow 09/22/2022    APPEARANCEUA Hazy (A) 10/08/2021    SPECGRAV 1.005 09/22/2022    PHUR 7 09/22/2022    PROTEINUA Negative 10/08/2021    KETONESU neg 09/22/2022    LEUKOCYTESUR Negative 10/08/2021    NITRITE neg 09/22/2022    UROBILINOGEN neg 09/22/2022       Lab Results   Component Value Date    CRP 0.8 10/30/2023       Lab Results   Component Value Date    SEDRATE 2 10/30/2023       Lab Results   Component Value Date    RF 47.0 (H) 08/18/2022    SEDRATE 2 10/30/2023       No components found for: "25OHVITDTOT", "03LECWJD1", "19AHHPRN8", "METHODNOTE"    Lab Results   Component Value Date    URICACID 4.7 08/18/2022       No components found for: "TSPOTTB"        Imaging:  All imaging reviewed and independently interpreted by me.         ASSESSMENT / PLAN:     Awilda Mckeon is a 43 y.o. White female with:      1. Psoriatic arthritis, stable   - patient with PsA, +synovitis, dactylitis  - doing well   - continue Humira for now. Can consider rizankizumab in the future or otezla.   - hands ok today  - reassurance    2. Boils in groin  - she has been having intermittent boils in both groin areas.   - on doxycycline for the past month per dermatology, along with topical clindamycin.   - avoid shaving  - ?related to Humira - infection?. Unlikely to be HS as it would have improved with Humira.   - if no improvement with management per derm, will consider stopping humira and switching to skyrizi.   - advised patient to ask dermatology office to send the records.      2. Encounter for long-term (current) use of other medications  - monitor every 6 months.   - labs in 10/2023 were stable.   - Pregnancy Counseling (she had ablation)   - no live vaccines  - vaccines per guidelines  - immunosuppression/infectious precautions discussed     3. Other " specified counseling  - over 10 minutes spent regarding below topics:  - Immunization counseling done.  - Weight loss counseling done.  - Nutrition and exercise counseling.  - Limitation of alcohol consumption.  - Regular exercise:  Aerobic and resistance.  - Medication counseling provided.    Follow up in about 3 months (around 4/4/2024).    Method of contact with patient concerns: [facility]    Disclaimer:  This note is prepared using voice recognition software and as such is likely to have errors and has not been proof read. Please contact me for questions.     Time spent: 30 minutes in face to face discussion concerning diagnosis, prognosis, review of lab and test results, benefits of treatment as well as management of disease, counseling of patient and coordination of care between various health care providers.  Greater than half the time spent was used for coordination of care and counseling of patient.    Daisha Luong M.D.  Rheumatology Department   [facility]

## 2024-02-08 ENCOUNTER — PATIENT MESSAGE (OUTPATIENT)
Dept: ADMINISTRATIVE | Facility: OTHER | Age: 44
End: 2024-02-08
Payer: COMMERCIAL

## 2024-03-07 ENCOUNTER — PATIENT MESSAGE (OUTPATIENT)
Dept: ADMINISTRATIVE | Facility: OTHER | Age: 44
End: 2024-03-07
Payer: COMMERCIAL

## 2024-04-08 ENCOUNTER — PATIENT MESSAGE (OUTPATIENT)
Dept: ADMINISTRATIVE | Facility: OTHER | Age: 44
End: 2024-04-08
Payer: COMMERCIAL

## 2024-04-22 ENCOUNTER — OFFICE VISIT (OUTPATIENT)
Dept: RHEUMATOLOGY | Facility: CLINIC | Age: 44
End: 2024-04-22
Payer: COMMERCIAL

## 2024-04-22 VITALS
SYSTOLIC BLOOD PRESSURE: 124 MMHG | HEART RATE: 89 BPM | DIASTOLIC BLOOD PRESSURE: 80 MMHG | BODY MASS INDEX: 23.74 KG/M2 | HEIGHT: 62 IN | WEIGHT: 129 LBS

## 2024-04-22 DIAGNOSIS — L73.2 HIDRADENITIS SUPPURATIVA: ICD-10-CM

## 2024-04-22 DIAGNOSIS — D84.821 IMMUNODEFICIENCY DUE TO TREATMENT WITH IMMUNOSUPPRESSIVE MEDICATION: ICD-10-CM

## 2024-04-22 DIAGNOSIS — Z13.220 SCREENING FOR LIPID DISORDERS: ICD-10-CM

## 2024-04-22 DIAGNOSIS — L40.50 PSORIATIC ARTHRITIS: Primary | ICD-10-CM

## 2024-04-22 DIAGNOSIS — Z79.899 IMMUNODEFICIENCY DUE TO TREATMENT WITH IMMUNOSUPPRESSIVE MEDICATION: ICD-10-CM

## 2024-04-22 DIAGNOSIS — Z71.89 COUNSELING AND COORDINATION OF CARE: ICD-10-CM

## 2024-04-22 PROCEDURE — 3079F DIAST BP 80-89 MM HG: CPT | Mod: CPTII,S$GLB,, | Performed by: STUDENT IN AN ORGANIZED HEALTH CARE EDUCATION/TRAINING PROGRAM

## 2024-04-22 PROCEDURE — 99214 OFFICE O/P EST MOD 30 MIN: CPT | Mod: S$GLB,,, | Performed by: STUDENT IN AN ORGANIZED HEALTH CARE EDUCATION/TRAINING PROGRAM

## 2024-04-22 PROCEDURE — 3074F SYST BP LT 130 MM HG: CPT | Mod: CPTII,S$GLB,, | Performed by: STUDENT IN AN ORGANIZED HEALTH CARE EDUCATION/TRAINING PROGRAM

## 2024-04-22 PROCEDURE — 3008F BODY MASS INDEX DOCD: CPT | Mod: CPTII,S$GLB,, | Performed by: STUDENT IN AN ORGANIZED HEALTH CARE EDUCATION/TRAINING PROGRAM

## 2024-04-22 PROCEDURE — 1159F MED LIST DOCD IN RCRD: CPT | Mod: CPTII,S$GLB,, | Performed by: STUDENT IN AN ORGANIZED HEALTH CARE EDUCATION/TRAINING PROGRAM

## 2024-04-22 PROCEDURE — 99999 PR PBB SHADOW E&M-EST. PATIENT-LVL III: CPT | Mod: PBBFAC,,, | Performed by: STUDENT IN AN ORGANIZED HEALTH CARE EDUCATION/TRAINING PROGRAM

## 2024-04-22 NOTE — PROGRESS NOTES
4/22/2024     1:16 PM   Rapid3 Question Responses and Scores   MDHAQ Score 0   Psychologic Score 0   Pain Score 2   When you awakened in the morning OVER THE LAST WEEK, did you feel stiff? No   Fatigue Score 3   Global Health Score 0   RAPID3 Score 0.67

## 2024-04-22 NOTE — PROGRESS NOTES
RHEUMATOLOGY OUTPATIENT CLINIC NOTE    4/22/2024    Attending Rheumatologist: Daisha Luong  Primary Care Provider: Jenn Briones DO   Physician Requesting Consultation: No referring provider defined for this encounter.  Chief Complaint/Reason For Consultation:  Psoriatic Arthritis        Subjective:       HPI  Awilda Mckeon is a 43 y.o. White female with medical history noted below who presents for evaluation of PsA.    Interim history  -Last visit with me on 1/2024  -has seen dermatology since lat visit. Had triamcinolone injection recently which helped significantly. Boil is resolved. Now only has the scar. She continues to take doxycycline daily. Will follow up with him soon.   -recent labs were stable.   -PsA has been doing great with Humira    Disease history     Used to follow with Dr. Bruno at [place]. Last visit on 4/2023   She notes being diagnosed at the age of 34 with PsA, PsO started in high school. MCP swelling, dactylitis, rash, +FHX. She has tried MTX and SSZ, suffered from Nausea, but did note improvement. Patient endorses worse joints to be hands, knees, feet, elbow, wrist. +Swelling, Dactylitis. Morning stiffness lasting hours. Had baker cyst rupture with left leg swelling in June has improved. +Bloody stools (Hx of hemorrhoids), Genital PsO, Nail changes, +FHX, Night sweats, fatigue. No EYE inflammation, IBP, Devi, wt loss.   LEF caused changes in her mood  Humira started a year ago      Review of Systems   Constitutional:  Negative for chills, fatigue, fever and unexpected weight change.   HENT:  Negative for mouth sores and trouble swallowing.    Eyes:  Negative for redness and eye dryness.   Respiratory:  Negative for cough and shortness of breath.    Cardiovascular:  Negative for chest pain.   Gastrointestinal:  Negative for abdominal distention, constipation, diarrhea, nausea and vomiting.   Genitourinary:  Negative for dysuria, genital sores and vaginal dryness.    Musculoskeletal:  Negative for arthralgias, back pain, gait problem, joint swelling, leg pain, myalgias, neck pain, neck stiffness and joint deformity.   Integumentary:  Positive for rash.   Neurological:  Negative for weakness, numbness and headaches.   Hematological:  Negative for adenopathy. Does not bruise/bleed easily.   Psychiatric/Behavioral:  Negative for confusion, decreased concentration and sleep disturbance. The patient is not nervous/anxious.    All other systems reviewed and are negative.       Chronic comorbid conditions affecting medical decision making today:  Past Medical History:   Diagnosis Date    Abnormal Pap smear of cervix     cyro done, nl since years ago,  LGSIL + HPV 16 COLPO DONE.    Abnormal uterine bleeding (AUB) 10/23/2019    Arthritis     Menorrhagia 2019    Menorrhagia with regular cycle 2019    - followed by Gynecology and plan for endometrial ablation    Mitral valve prolapse     Pregnancy         Psoriasis      Past Surgical History:   Procedure Laterality Date    COLONOSCOPY N/A 6/3/2021    Procedure: COLONOSCOPY;  Surgeon: Alexa Palacios MD;  Location: [facility];  Service: Endoscopy;  Laterality: N/A;    COLPOSCOPY      remote    DILATION AND CURETTAGE OF UTERUS N/A 10/4/2019    Procedure: DILATION AND CURETTAGE, UTERUS;  Surgeon: Karlene Chavez MD;  Location: [facility];  Service: OB/GYN;  Laterality: N/A;    ENDOMETRIAL ABLATION      RHINOPLASTY      THERMAL ABLATION OF ENDOMETRIUM USING HYSTEROSCOPY N/A 10/4/2019    Procedure: ABLATION, ENDOMETRIUM, THERMAL, HYSTEROSCOPIC;  Surgeon: Karlene Chavez MD;  Location: [facility];  Service: OB/GYN;  Laterality: N/A;    TONSILLECTOMY      TYMPANOSTOMY TUBE PLACEMENT       Family History   Problem Relation Name Age of Onset    Hypertension Mother      Hyperlipidemia Father      No Known Problems Sister      Epilepsy Daughter      No Known Problems Son      No Known Problems Maternal Grandmother      No  Known Problems Maternal Grandfather      No Known Problems Paternal Grandmother      No Known Problems Paternal Grandfather      Breast cancer Neg Hx      Colon cancer Neg Hx      Ovarian cancer Neg Hx       Social History     Substance and Sexual Activity   Alcohol Use Yes    Alcohol/week: 2.0 standard drinks of alcohol    Types: 1 Cans of beer, 1 Shots of liquor per week    Comment: socially     Social History     Tobacco Use   Smoking Status Every Day    Current packs/day: 0.00    Average packs/day: 0.5 packs/day for 20.0 years (10.0 ttl pk-yrs)    Types: Cigarettes, Vaping with nicotine    Start date: 2002    Last attempt to quit: 2022    Years since quittin.1   Smokeless Tobacco Never   Tobacco Comments    Vaping daily     Social History     Substance and Sexual Activity   Drug Use No       Current Outpatient Medications:     adalimumab (HUMIRA,CF, PEN) 40 mg/0.4 mL PnKt, Inject 0.4 mL (40 mg total) into the skin every 14 (fourteen) days., Disp: 2 pen , Rfl: 11    doxycycline (VIBRA-TABS) 100 MG tablet, Take 1 tablet by mouth every day, Disp: 90 tablet, Rfl: 1    ferrous sulfate (FEOSOL) 325 mg (65 mg iron) Tab tablet, Take 325 mg by mouth daily with breakfast., Disp: , Rfl:     Lactobac no.41/Bifidobact no.7 (PROBIOTIC-10 ORAL), Take 1 tablet by mouth once daily at 6am., Disp: , Rfl:     linaCLOtide (LINZESS) 72 mcg Cap capsule, Take 1 capsule (72 mcg total) by mouth once daily., Disp: 90 capsule, Rfl: 0    microfibrillar collagen powder, Apply 1 g topically as needed., Disp: , Rfl:     multivitamin (THERAGRAN) per tablet, Take 1 tablet by mouth once daily., Disp: , Rfl:     vitamin D (VITAMIN D3) 1000 units Tab, Take 1,000 Units by mouth once daily., Disp: , Rfl:     biotin 1 mg tablet, Take 1,000 mcg by mouth 3 (three) times daily., Disp: , Rfl:     clindamycin (CLEOCIN T) 1 % external solution, Apply topically to affected area(s) 2 (two) times daily., Disp: 30 mL, Rfl: 3    clindamycin phosphate  "1% (CLINDAGEL) 1 % gel, Use one application topically twice daily., Disp: 60 g, Rfl: 1    clobetasoL (TEMOVATE) 0.05 % cream, Apply topically 2 (two) times daily., Disp: 45 g, Rfl: 1    Current Facility-Administered Medications:     etonogestreL subdermal device 68 mg, 68 mg, Implant, , Karlene Chavez MD, 68 mg at 11/10/23 1245     Objective:         Vitals:    04/22/24 1450   BP: 124/80   Pulse: 89     Physical Exam  Can make fist, no synovitis   Knee crepitus  Negative ankle/MTP  Lumbar extension/flexion ok    Reviewed old and all outside pertinent medical records available.    All lab results personally reviewed and interpreted by me.  Lab Results   Component Value Date    WBC 6.52 04/15/2024    HGB 13.8 04/15/2024    HCT 40.3 04/15/2024    MCV 88 04/15/2024    MCH 30.1 04/15/2024    MCHC 34.2 04/15/2024    RDW 12.4 04/15/2024     04/15/2024    MPV 9.6 04/15/2024       Lab Results   Component Value Date     04/15/2024    K 3.7 04/15/2024     04/15/2024    CO2 25 04/15/2024    GLU 91 04/15/2024    BUN 13 04/15/2024    CALCIUM 8.9 04/15/2024    PROT 6.3 04/15/2024    ALBUMIN 4.0 04/15/2024    BILITOT 0.5 04/15/2024    AST 15 04/15/2024    ALKPHOS 40 (L) 04/15/2024    ALT 11 04/15/2024       Lab Results   Component Value Date    COLORU Yellow 09/22/2022    APPEARANCEUA Hazy (A) 10/08/2021    SPECGRAV 1.005 09/22/2022    PHUR 7 09/22/2022    PROTEINUA Negative 10/08/2021    KETONESU neg 09/22/2022    LEUKOCYTESUR Negative 10/08/2021    NITRITE neg 09/22/2022    UROBILINOGEN neg 09/22/2022       Lab Results   Component Value Date    CRP 0.8 10/30/2023       Lab Results   Component Value Date    SEDRATE 2 10/30/2023       Lab Results   Component Value Date    RF 47.0 (H) 08/18/2022    SEDRATE 2 10/30/2023       No components found for: "25OHVITDTOT", "67TGDTWF8", "34RNBKLN9", "METHODNOTE"    Lab Results   Component Value Date    URICACID 4.7 08/18/2022       No components found for: " ""TSPOTTB"        Imaging:  All imaging reviewed and independently interpreted by me.         ASSESSMENT / PLAN:     Awilda Mckeon is a 43 y.o. White female with:      1. Psoriatic arthritis, stable   - patient with PsA, +synovitis, dactylitis  - doing well   - continue Humira 40 mg SC Q2W  - hands ok today  - reassurance    2. Hidradenitis suppurativa  - she has been having intermittent boils in both groin areas.   - on doxycycline for the past month per dermatology, along with topical clindamycin.   - resolved with triamcinolone injection   - if worsening, may consider changing humira to cosentyx.     3. Drug induced immunodeficiency  - monitor every 6 months.   - labs in 4/2024 were stable.   - Pregnancy Counseling (she had ablation)   - no live vaccines  - vaccines per guidelines  - immunosuppression/infectious precautions discussed   - obtain lipid panel in 6 months     4. Other specified counseling  - over 10 minutes spent regarding below topics:  - Immunization counseling done.  - Weight loss counseling done.  - Nutrition and exercise counseling.  - Limitation of alcohol consumption.  - Regular exercise:  Aerobic and resistance.  - Medication counseling provided.    Follow up in about 6 months (around 10/22/2024).    Method of contact with patient concerns: [facility]    Disclaimer:  This note is prepared using voice recognition software and as such is likely to have errors and has not been proof read. Please contact me for questions.     Time spent: 30 minutes in face to face discussion concerning diagnosis, prognosis, review of lab and test results, benefits of treatment as well as management of disease, counseling of patient and coordination of care between various health care providers.  Greater than half the time spent was used for coordination of care and counseling of patient.    Daisha Luong M.D.  Rheumatology Department   [facility]    "

## 2024-08-23 DIAGNOSIS — L40.50 PSA (PSORIATIC ARTHRITIS): ICD-10-CM

## 2024-08-26 RX ORDER — ADALIMUMAB 40MG/0.4ML
40 KIT SUBCUTANEOUS
Qty: 2 PEN | Refills: 11 | Status: ACTIVE | OUTPATIENT
Start: 2024-08-26 | End: 2025-07-28

## 2024-08-29 ENCOUNTER — PATIENT MESSAGE (OUTPATIENT)
Dept: RHEUMATOLOGY | Facility: CLINIC | Age: 44
End: 2024-08-29
Payer: COMMERCIAL

## 2024-11-07 ENCOUNTER — OFFICE VISIT (OUTPATIENT)
Dept: RHEUMATOLOGY | Facility: CLINIC | Age: 44
End: 2024-11-07
Payer: COMMERCIAL

## 2024-11-07 VITALS
HEIGHT: 62 IN | SYSTOLIC BLOOD PRESSURE: 135 MMHG | DIASTOLIC BLOOD PRESSURE: 93 MMHG | BODY MASS INDEX: 23.57 KG/M2 | HEART RATE: 83 BPM | WEIGHT: 128.06 LBS

## 2024-11-07 DIAGNOSIS — Z71.89 COUNSELING AND COORDINATION OF CARE: ICD-10-CM

## 2024-11-07 DIAGNOSIS — L40.50 PSA (PSORIATIC ARTHRITIS): Primary | ICD-10-CM

## 2024-11-07 DIAGNOSIS — Z79.899 IMMUNODEFICIENCY DUE TO TREATMENT WITH IMMUNOSUPPRESSIVE MEDICATION: ICD-10-CM

## 2024-11-07 DIAGNOSIS — L73.2 HIDRADENITIS SUPPURATIVA: ICD-10-CM

## 2024-11-07 DIAGNOSIS — D84.821 IMMUNODEFICIENCY DUE TO TREATMENT WITH IMMUNOSUPPRESSIVE MEDICATION: ICD-10-CM

## 2024-11-07 PROCEDURE — 99214 OFFICE O/P EST MOD 30 MIN: CPT | Mod: S$GLB,,, | Performed by: STUDENT IN AN ORGANIZED HEALTH CARE EDUCATION/TRAINING PROGRAM

## 2024-11-07 PROCEDURE — 3008F BODY MASS INDEX DOCD: CPT | Mod: CPTII,S$GLB,, | Performed by: STUDENT IN AN ORGANIZED HEALTH CARE EDUCATION/TRAINING PROGRAM

## 2024-11-07 PROCEDURE — 99999 PR PBB SHADOW E&M-EST. PATIENT-LVL III: CPT | Mod: PBBFAC,,, | Performed by: STUDENT IN AN ORGANIZED HEALTH CARE EDUCATION/TRAINING PROGRAM

## 2024-11-07 PROCEDURE — 3080F DIAST BP >= 90 MM HG: CPT | Mod: CPTII,S$GLB,, | Performed by: STUDENT IN AN ORGANIZED HEALTH CARE EDUCATION/TRAINING PROGRAM

## 2024-11-07 PROCEDURE — 3075F SYST BP GE 130 - 139MM HG: CPT | Mod: CPTII,S$GLB,, | Performed by: STUDENT IN AN ORGANIZED HEALTH CARE EDUCATION/TRAINING PROGRAM

## 2024-11-07 PROCEDURE — 1159F MED LIST DOCD IN RCRD: CPT | Mod: CPTII,S$GLB,, | Performed by: STUDENT IN AN ORGANIZED HEALTH CARE EDUCATION/TRAINING PROGRAM

## 2024-11-07 NOTE — PROGRESS NOTES
11/7/2024     1:19 PM   Rapid3 Question Responses and Scores   MDHAQ Score 0   Psychologic Score 2.2   Pain Score 0   When you awakened in the morning OVER THE LAST WEEK, did you feel stiff? No   Fatigue Score 0   Global Health Score 0   RAPID3 Score 0

## 2024-11-07 NOTE — PROGRESS NOTES
RHEUMATOLOGY OUTPATIENT CLINIC NOTE    11/7/2024    Attending Rheumatologist: Daisha Luong  Primary Care Provider: Jenn Briones DO   Physician Requesting Consultation: No referring provider defined for this encounter.  Chief Complaint/Reason For Consultation:  Psoriatic Arthritis        Subjective:       HPI  Awilda Mckeon is a 44 y.o. White female with medical history noted below who presents for evaluation of PsA.    Interim history  -last visit in April 24  -she is on Humira every 2 weeks  -Recent labs showed unremarkable CBC and CMP. Hepatitis B and C serologies negative. Quantiferon negative  -She follows with   -she takes doxycycline 100 mg daily for prevention of HS. She has intermittent boil in groin that comes and goes. She follows with dermatologist Dr. Hammonds    -PsA has been doing great with Humira. No swelling or pain in joints. Psoriasis controlled     Disease history     Used to follow with Dr. Bruno at [place]. Last visit on 4/2023   She notes being diagnosed at the age of 34 with PsA, PsO started in high school. MCP swelling, dactylitis, rash, +FHX. She has tried MTX and SSZ, suffered from Nausea, but did note improvement. Patient endorses worse joints to be hands, knees, feet, elbow, wrist. +Swelling, Dactylitis. Morning stiffness lasting hours. Had baker cyst rupture with left leg swelling in June has improved. +Bloody stools (Hx of hemorrhoids), Genital PsO, Nail changes, +FHX, Night sweats, fatigue. No EYE inflammation, IBP, Devi, wt loss.   LEF caused changes in her mood  Humira started a year ago      Review of Systems   Constitutional:  Negative for fever and unexpected weight change.   HENT:  Negative for mouth sores and trouble swallowing.    Eyes:  Negative for redness.   Respiratory:  Negative for cough and shortness of breath.    Cardiovascular:  Negative for chest pain.   Gastrointestinal:  Negative for constipation and diarrhea.   Genitourinary:  Negative for  dysuria and genital sores.   Integumentary:  Negative for rash.   Neurological:  Negative for headaches.   Hematological:  Does not bruise/bleed easily.        Chronic comorbid conditions affecting medical decision making today:  Past Medical History:   Diagnosis Date    Abnormal Pap smear of cervix     cyro done, nl since years ago,  LGSIL + HPV 16 COLPO DONE.    Abnormal uterine bleeding (AUB) 10/23/2019    Arthritis     Menorrhagia 2019    Menorrhagia with regular cycle 2019    - followed by Gynecology and plan for endometrial ablation    Mitral valve prolapse     Pregnancy         Psoriasis      Past Surgical History:   Procedure Laterality Date    COLONOSCOPY N/A 6/3/2021    Procedure: COLONOSCOPY;  Surgeon: Alexa Palacios MD;  Location: [facility];  Service: Endoscopy;  Laterality: N/A;    COLPOSCOPY      remote    DILATION AND CURETTAGE OF UTERUS N/A 10/4/2019    Procedure: DILATION AND CURETTAGE, UTERUS;  Surgeon: Karlene Chavez MD;  Location: [facility] OR;  Service: OB/GYN;  Laterality: N/A;    ENDOMETRIAL ABLATION      RHINOPLASTY      THERMAL ABLATION OF ENDOMETRIUM USING HYSTEROSCOPY N/A 10/4/2019    Procedure: ABLATION, ENDOMETRIUM, THERMAL, HYSTEROSCOPIC;  Surgeon: Karlene Chavez MD;  Location: [facility];  Service: OB/GYN;  Laterality: N/A;    TONSILLECTOMY      TYMPANOSTOMY TUBE PLACEMENT       Family History   Problem Relation Name Age of Onset    Hypertension Mother      Hyperlipidemia Father      No Known Problems Sister      Epilepsy Daughter      No Known Problems Son      No Known Problems Maternal Grandmother      No Known Problems Maternal Grandfather      No Known Problems Paternal Grandmother      No Known Problems Paternal Grandfather      Breast cancer Neg Hx      Colon cancer Neg Hx      Ovarian cancer Neg Hx       Social History     Substance and Sexual Activity   Alcohol Use Yes    Alcohol/week: 2.0 standard drinks of alcohol    Types: 1 Cans of beer, 1 Shots of  liquor per week    Comment: socially     Social History     Tobacco Use   Smoking Status Every Day    Current packs/day: 0.00    Average packs/day: 0.5 packs/day for 20.0 years (10.0 ttl pk-yrs)    Types: Cigarettes, Vaping with nicotine    Start date: 2002    Last attempt to quit: 2022    Years since quittin.6   Smokeless Tobacco Never   Tobacco Comments    Vaping daily     Social History     Substance and Sexual Activity   Drug Use No       Current Outpatient Medications:     adalimumab (HUMIRA,CF, PEN) 40 mg/0.4 mL PnKt, Inject 0.4 mL (40 mg total) into the skin every 14 (fourteen) days., Disp: 2 pen , Rfl: 11    doxycycline (VIBRA-TABS) 100 MG tablet, Take 1 tablet by mouth every day, Disp: 90 tablet, Rfl: 1    doxycycline (VIBRA-TABS) 100 MG tablet, 1(one) tablet(s) oral every day, Disp: 90 tablet, Rfl: 1    ferrous sulfate (FEOSOL) 325 mg (65 mg iron) Tab tablet, Take 325 mg by mouth daily with breakfast., Disp: , Rfl:     Lactobac no.41/Bifidobact no.7 (PROBIOTIC-10 ORAL), Take 1 tablet by mouth once daily at 6am., Disp: , Rfl:     microfibrillar collagen powder, Apply 1 g topically as needed., Disp: , Rfl:     multivitamin (THERAGRAN) per tablet, Take 1 tablet by mouth once daily., Disp: , Rfl:     vitamin D (VITAMIN D3) 1000 units Tab, Take 1,000 Units by mouth once daily., Disp: , Rfl:     Current Facility-Administered Medications:     etonogestreL subdermal device 68 mg, 68 mg, Implant, , Karlene Chavez MD, 68 mg at 11/10/23 1245     Objective:         Vitals:    24 1339   BP: (!) 135/93   Pulse: 83     Physical Exam  Can make fist, no synovitis   Knee crepitus  Negative ankle/MTP  Lumbar extension/flexion ok    Reviewed old and all outside pertinent medical records available.    All lab results personally reviewed and interpreted by me.  Lab Results   Component Value Date    WBC 5.32 10/29/2024    HGB 14.3 10/29/2024    HCT 42.8 10/29/2024    MCV 89 10/29/2024    MCH 29.7 10/29/2024  "   MCHC 33.4 10/29/2024    RDW 12.0 10/29/2024     10/29/2024    MPV 9.5 10/29/2024       Lab Results   Component Value Date     10/29/2024    K 3.5 10/29/2024     10/29/2024    CO2 24 10/29/2024    GLU 92 10/29/2024    BUN 10 10/29/2024    CALCIUM 9.2 10/29/2024    PROT 7.0 10/29/2024    ALBUMIN 4.2 10/29/2024    BILITOT 0.9 10/29/2024    AST 17 10/29/2024    ALKPHOS 34 (L) 10/29/2024    ALT 14 10/29/2024       Lab Results   Component Value Date    COLORU Yellow 09/22/2022    APPEARANCEUA Hazy (A) 10/08/2021    SPECGRAV 1.005 09/22/2022    PHUR 7 09/22/2022    PROTEINUA Negative 10/08/2021    KETONESU neg 09/22/2022    LEUKOCYTESUR Negative 10/08/2021    NITRITE neg 09/22/2022    UROBILINOGEN neg 09/22/2022       Lab Results   Component Value Date    CRP 0.8 10/30/2023       Lab Results   Component Value Date    SEDRATE 2 10/30/2023       Lab Results   Component Value Date    RF 47.0 (H) 08/18/2022    SEDRATE 2 10/30/2023       No components found for: "25OHVITDTOT", "53KFNWPS5", "45LQXPXQ8", "METHODNOTE"    Lab Results   Component Value Date    URICACID 4.7 08/18/2022       No components found for: "TSPOTTB"      Imaging:  All imaging reviewed and independently interpreted by me.       ASSESSMENT / PLAN:     Awilda Mckeon is a 44 y.o. White female with:    1. Psoriatic arthritis, stable   - patient with PsA, +synovitis, dactylitis  - doing well   - continue Humira 40 mg SC Q2W  - reassurance    2. Hidradenitis suppurativa  - she has been having intermittent boils in both groin areas.   - on doxycycline daily per dermatology   - if worsening, may consider changing humira to cosentyx.     3. Drug induced immunodeficiency  - monitor every 6 months.   - labs in 10/2024 were stable.   - Pregnancy Counseling (she had ablation)   - no live vaccines  - vaccines per guidelines  - immunosuppression/infectious precautions discussed   - obtain lipid panel in 6 months     4. Other specified " counseling  - over 10 minutes spent regarding below topics:  - Immunization counseling done.  - Weight loss counseling done.  - Nutrition and exercise counseling.  - Limitation of alcohol consumption.  - Regular exercise:  Aerobic and resistance.  - Medication counseling provided.    Follow up in about 6 months (around 5/7/2025).    Method of contact with patient concerns: [facility]    Disclaimer:  This note is prepared using voice recognition software and as such is likely to have errors and has not been proof read. Please contact me for questions.     Time spent: 10 minutes in face to face discussion concerning diagnosis, prognosis, review of lab and test results, benefits of treatment as well as management of disease, counseling of patient and coordination of care between various health care providers.      Daisha Luong M.D.  Rheumatology Department   [facility]

## 2024-12-05 ENCOUNTER — OFFICE VISIT (OUTPATIENT)
Dept: FAMILY MEDICINE | Facility: CLINIC | Age: 44
End: 2024-12-05
Payer: COMMERCIAL

## 2024-12-05 VITALS
WEIGHT: 127.31 LBS | HEART RATE: 78 BPM | DIASTOLIC BLOOD PRESSURE: 86 MMHG | HEIGHT: 62 IN | OXYGEN SATURATION: 98 % | BODY MASS INDEX: 23.43 KG/M2 | SYSTOLIC BLOOD PRESSURE: 118 MMHG

## 2024-12-05 DIAGNOSIS — Z23 NEED FOR VACCINATION: Primary | ICD-10-CM

## 2024-12-05 DIAGNOSIS — Z12.31 ENCOUNTER FOR SCREENING MAMMOGRAM FOR MALIGNANT NEOPLASM OF BREAST: ICD-10-CM

## 2024-12-05 DIAGNOSIS — K62.5 BRBPR (BRIGHT RED BLOOD PER RECTUM): ICD-10-CM

## 2024-12-05 DIAGNOSIS — F17.200 TOBACCO DEPENDENCE: ICD-10-CM

## 2024-12-05 PROCEDURE — 90471 IMMUNIZATION ADMIN: CPT | Mod: S$GLB,,, | Performed by: STUDENT IN AN ORGANIZED HEALTH CARE EDUCATION/TRAINING PROGRAM

## 2024-12-05 PROCEDURE — 90656 IIV3 VACC NO PRSV 0.5 ML IM: CPT | Mod: S$GLB,,, | Performed by: STUDENT IN AN ORGANIZED HEALTH CARE EDUCATION/TRAINING PROGRAM

## 2024-12-05 PROCEDURE — 1160F RVW MEDS BY RX/DR IN RCRD: CPT | Mod: CPTII,S$GLB,, | Performed by: STUDENT IN AN ORGANIZED HEALTH CARE EDUCATION/TRAINING PROGRAM

## 2024-12-05 PROCEDURE — 3074F SYST BP LT 130 MM HG: CPT | Mod: CPTII,S$GLB,, | Performed by: STUDENT IN AN ORGANIZED HEALTH CARE EDUCATION/TRAINING PROGRAM

## 2024-12-05 PROCEDURE — 1159F MED LIST DOCD IN RCRD: CPT | Mod: CPTII,S$GLB,, | Performed by: STUDENT IN AN ORGANIZED HEALTH CARE EDUCATION/TRAINING PROGRAM

## 2024-12-05 PROCEDURE — 90677 PCV20 VACCINE IM: CPT | Mod: S$GLB,,, | Performed by: STUDENT IN AN ORGANIZED HEALTH CARE EDUCATION/TRAINING PROGRAM

## 2024-12-05 PROCEDURE — 99386 PREV VISIT NEW AGE 40-64: CPT | Mod: 25,S$GLB,, | Performed by: STUDENT IN AN ORGANIZED HEALTH CARE EDUCATION/TRAINING PROGRAM

## 2024-12-05 PROCEDURE — 90472 IMMUNIZATION ADMIN EACH ADD: CPT | Mod: S$GLB,,, | Performed by: STUDENT IN AN ORGANIZED HEALTH CARE EDUCATION/TRAINING PROGRAM

## 2024-12-05 PROCEDURE — 3008F BODY MASS INDEX DOCD: CPT | Mod: CPTII,S$GLB,, | Performed by: STUDENT IN AN ORGANIZED HEALTH CARE EDUCATION/TRAINING PROGRAM

## 2024-12-05 PROCEDURE — 3079F DIAST BP 80-89 MM HG: CPT | Mod: CPTII,S$GLB,, | Performed by: STUDENT IN AN ORGANIZED HEALTH CARE EDUCATION/TRAINING PROGRAM

## 2024-12-05 PROCEDURE — 99999 PR PBB SHADOW E&M-EST. PATIENT-LVL IV: CPT | Mod: PBBFAC,,, | Performed by: STUDENT IN AN ORGANIZED HEALTH CARE EDUCATION/TRAINING PROGRAM

## 2024-12-05 RX ORDER — CITALOPRAM 10 MG/1
10 TABLET ORAL DAILY
Qty: 30 TABLET | Refills: 11 | Status: SHIPPED | OUTPATIENT
Start: 2024-12-05 | End: 2025-12-05

## 2024-12-05 NOTE — PROGRESS NOTES
Patient ID: Awilda Mckeon is a 44 y.o. female.    Chief Complaint: Establish Care and concerns of cancer    History of Present Illness    CHIEF COMPLAINT:  Awilda presents today for follow-up of psoriatic arthritis and concerns about hemorrhoids.    PSORIATIC ARTHRITIS:  She reports Humira is effective for managing her joint symptoms. She undergoes regular labs every six months as part of her treatment monitoring.    DERMATOLOGICAL ISSUES:  She reports experiencing acne-like bumps on her skin. She has been prescribed doxycycline for several months, which has shown some improvement, but the bumps occasionally still appear. She is scheduled for follow-up next month to reassess the condition.    GASTROINTESTINAL CONCERNS:  She reports constipation due to antibiotic use and is taking probiotics to help manage gut bacteria. She has a history of hemorrhoids, previously treated with band ligation by a rectal surgeon. She reports occasional bright red bleeding, noticed on toilet paper and sometimes in the bowl, occurring a few times per month. She denies black stools. Stress exacerbates her hemorrhoid symptoms, particularly since her father's passing. She uses stool softeners occasionally when constipated to prevent tearing and bleeding.    GYNECOLOGICAL HEALTH:  She underwent an ablation procedure 4 years ago and denies any menstrual bleeding since. She reports being up to date with her pap smears and has an upcoming GYN appointment scheduled for next week.    LIFESTYLE AND HABITS:  She currently vapes, having transitioned from heavy smoking, and is considering quitting completely. She exercises regularly, using a home exercise machine and walking in the neighborhood. She tries to maintain a healthy diet, though admits to some carbohydrate intake. She takes vitamin C daily as a preventive measure and reports not having been sick with a cold in years.    MENTAL HEALTH:  She reports increased stress and depression  since her father's passing. She describes feeling unlike herself, with mood changes noticeable to friends. She reports days of self-isolation and avoiding social interaction. She denies thoughts of self-harm but acknowledges feeling that she is no longer the same person she used to be.    FAMILY HISTORY:  She denies family history of breast cancer, ovarian cancer, or colon cancer. She lives with her spouse, and her children are older and no longer live at home.    SOCIAL HISTORY:  She is currently employed.      ROS:  General: -fever, -chills, -fatigue, -weight gain, -weight loss  Eyes: -vision changes, -redness, -discharge  ENT: -ear pain, -nasal congestion, -sore throat  Cardiovascular: -chest pain, -palpitations, -lower extremity edema  Respiratory: -cough, -shortness of breath  Gastrointestinal: -abdominal pain, -nausea, -vomiting, -diarrhea, +constipation, -blood in stool, +bright red blood per rectum  Genitourinary: -dysuria, -hematuria, -frequency  Musculoskeletal: -joint pain, -muscle pain  Skin: +rash, -lesion  Neurological: -headache, -dizziness, -numbness, -tingling  Psychiatric: -anxiety, +depression, -sleep difficulty, -emotional lability         Physical Exam    General: No acute distress. Well-developed. Well-nourished.  Eyes: EOMI. Sclerae anicteric.  HENT: Normocephalic. Atraumatic. Nares patent. Moist oral mucosa.  Ears: Bilateral TMs clear. Bilateral EACs clear.  Cardiovascular: Regular rate. Regular rhythm. No murmurs. No rubs. No gallops. Normal S1, S2.  Respiratory: Normal respiratory effort. Clear to auscultation bilaterally. No rales. No rhonchi. No wheezing.  Abdomen: Soft. Non-tender. Non-distended. Normoactive bowel sounds.  Musculoskeletal: No  obvious deformity.  Extremities: No lower extremity edema.  Neurological: Alert & oriented x3. No slurred speech. Normal gait.  Psychiatric: Normal mood. Normal affect. Good insight. Good judgment.  Skin: Warm. Dry. No rash.         Assessment &  Plan    PSORIATIC ARTHRITIS:  - Continued Humira for psoriatic arthritis management, which has been effective for joint symptoms.  - Continued Humira at current dose for psoriatic arthritis management.  - Discussed importance of flu and pneumonia vaccines, especially for patients on immunosuppressants like Humira.    MAJOR DEPRESSIVE DISORDER:  - Reviewed patient's stress and mood changes since father's passing; initiated antidepressant therapy.  - Informed patient about potential side effects of antidepressants, including rare but serious risk of suicidal thoughts.  - Started citalopram for mood improvement; expect full effect in approximately 4 weeks.  - Contact the office if experiencing any increase in depressive symptoms or thoughts of self-harm while on citalopram.    ACNE:  - Evaluated ongoing acne-like skin lesions treated with long-term doxycycline; considering potential Accutane if lesions persist.    HEMORRHOIDS:  - Assessed rectal bleeding, likely due to hemorrhoids.  - Explained that bright red rectal bleeding is typically indicative of hemorrhoids rather than more serious conditions.  - Referred to Gastroenterology for evaluation of recurrent rectal bleeding.    NICOTINE DEPENDENCE:  - Recommend participating in smoking cessation program to quit vaping.    PREVENTIVE CARE:  - Ordered flu vaccine, pneumonia vaccine, and mammogram.  - Awilda to maintain daily vitamin C supplementation.  - Awilda to continue current exercise routine, including walking and use of home exercise machine.    FOLLOW-UP:  - Follow up in 1 month to assess response to citalopram and potentially adjust dosage.       1. Need for vaccination  -     influenza (Flulaval, Fluzone, Fluarix) 45 mcg/0.5 mL IM vaccine (> or = 6 mo) 0.5 mL  -     pneumoc 20-roger conj-dip cr(PF) (PREVNAR-20 (PF)) injection Syrg 0.5 mL    2. Encounter for screening mammogram for malignant neoplasm of breast  -     Mammo Digital Screening Bilat w/ [facility]; Future;  Expected date: 12/05/2024    3. Tobacco dependence  -     Ambulatory referral/consult to Smoking Cessation Program; Future; Expected date: 12/12/2024    4. BRBPR (bright red blood per rectum)  -     Ambulatory referral/consult to Gastroenterology; Future; Expected date: 12/12/2024    Other orders  -     citalopram (CELEXA) 10 MG tablet; Take 1 tablet (10 mg total) by mouth once daily.  Dispense: 30 tablet; Refill: 11              No follow-ups on file.    This note was generated with the assistance of ambient listening technology. Verbal consent was obtained by the patient and accompanying visitor(s) for the recording of patient appointment to facilitate this note. I attest to having reviewed and edited the generated note for accuracy, though some syntax or spelling errors may persist. Please contact the author of this note for any clarification.

## 2024-12-12 ENCOUNTER — OFFICE VISIT (OUTPATIENT)
Dept: OBSTETRICS AND GYNECOLOGY | Facility: CLINIC | Age: 44
End: 2024-12-12
Payer: COMMERCIAL

## 2024-12-12 VITALS
BODY MASS INDEX: 23.52 KG/M2 | SYSTOLIC BLOOD PRESSURE: 122 MMHG | HEIGHT: 62 IN | WEIGHT: 127.81 LBS | HEART RATE: 80 BPM | DIASTOLIC BLOOD PRESSURE: 80 MMHG

## 2024-12-12 DIAGNOSIS — Z12.4 CERVICAL CANCER SCREENING: ICD-10-CM

## 2024-12-12 DIAGNOSIS — Z01.419 WELL WOMAN EXAM WITH ROUTINE GYNECOLOGICAL EXAM: Primary | ICD-10-CM

## 2024-12-12 PROCEDURE — 99999 PR PBB SHADOW E&M-EST. PATIENT-LVL III: CPT | Mod: PBBFAC,,, | Performed by: OBSTETRICS & GYNECOLOGY

## 2024-12-12 NOTE — PROGRESS NOTES
Subjective:    Patient ID: Awilda Mckeon is a 44 y.o. y.o. female.     Chief Complaint: Annual Well Woman Exam     History of Present Illness:  Awilda presents today for Annual Well Woman exam. She describes her menses as  absent.  H/o ablation and also has a Nexplanon (2023) .She denies pelvic pain.  She denies breast tenderness, masses, nipple discharge. She denies GYN complaints. She denies difficulty with urination or bowel movements. She denies bloating, early satiety, or weight changes. She is sexually active. Contraception is by Nexplanon.      Menstrual History:   No LMP recorded. Patient has had an ablation..     OB History          2    Para   2    Term   2       0    AB   0    Living             SAB   0    IAB   0    Ectopic   0    Multiple        Live Births                     The following portions of the patient's history were reviewed and updated as appropriate: allergies, current medications, past family history, past medical history, past social history, past surgical history, and problem list.    ROS:   Review of Systems   Constitutional:  Negative for chills, diaphoresis, fatigue, fever and unexpected weight change.   HENT:  Negative for congestion, hearing loss, postnasal drip, rhinorrhea, sinus pressure, sinus pain, sore throat and tinnitus.    Eyes:  Negative for pain, discharge and visual disturbance.   Respiratory:  Negative for apnea, cough, shortness of breath and wheezing.    Cardiovascular:  Negative for chest pain, palpitations and leg swelling.   Gastrointestinal:  Positive for anal bleeding. Negative for abdominal pain, constipation, diarrhea, nausea and vomiting.   Endocrine: Negative for cold intolerance, heat intolerance, polydipsia, polyphagia and polyuria.   Genitourinary:  Negative for difficulty urinating, dyspareunia, dysuria, enuresis, flank pain, frequency, genital sores, hematuria, menstrual problem, pelvic pain, urgency, vaginal bleeding,  vaginal discharge and vaginal pain.   Musculoskeletal:  Negative for arthralgias, back pain, joint swelling, myalgias, neck pain and neck stiffness.   Skin:  Negative for color change, pallor and rash.   Allergic/Immunologic: Negative for environmental allergies, food allergies and immunocompromised state.   Neurological:  Negative for dizziness, weakness, light-headedness, numbness and headaches.   Hematological:  Negative for adenopathy. Does not bruise/bleed easily.   Psychiatric/Behavioral:  Negative for agitation and confusion. The patient is not nervous/anxious.          Objective:    Vital Signs:  Vitals:    12/12/24 1334   BP: 122/80   Pulse: 80       Physical Exam: Examination performed with Chaperone present  General:  alert, cooperative, no distress   Skin:  Skin color, texture, turgor normal. No rashes or lesions   HEENT:  extra ocular movement intact, sclera clear, anicteric   Neck: supple, trachea midline, no adenopathy or thyromegally   Respiratory:  Normal effort   Breasts:  symmetric fibrous changes in both upper outer quadrants, no discharge, erythema, tenderness, or palpable masses; no axillary lymphadenopathy   Abdomen:  soft, nontender, no palpable masses   Pelvis: External genitalia: normal general appearance  Urinary system: urethral meatus normal, bladder nontender  Vaginal: normal mucosa without prolapse or lesions  Cervix: normal appearance  Uterus: normal size, shape, position  Adnexa: normal size, nontender bilaterally   Extremities: Normal ROM; no edema, no cyanosis   Neurologial: Normal strength and tone. No focal numbness or weakness.   Psychiatric: normal mood, speech, dress, and thought processes         Assessment:       Healthy female exam.     1. Well woman exam with routine gynecological exam    2. Cervical cancer screening          Plan:      Well woman exam with routine gynecological exam    Cervical cancer screening  -     Liquid-Based Pap Smear, Screening  -     HPV High Risk  Genotypes, PCR        MMG scheduled.  GI follow up scheduled    COUNSELING:  Awilda was counseled on A.C.O.G. Pap guidelines and recommendations for yearly pelvic exams in addition to recommendations for monthly self breast exams; to see her PCP for other health maintenance.

## 2024-12-13 ENCOUNTER — PATIENT MESSAGE (OUTPATIENT)
Dept: ADMINISTRATIVE | Facility: OTHER | Age: 44
End: 2024-12-13
Payer: COMMERCIAL

## 2025-01-14 ENCOUNTER — TELEPHONE (OUTPATIENT)
Dept: SURGERY | Facility: CLINIC | Age: 45
End: 2025-01-14
Payer: COMMERCIAL

## 2025-01-16 ENCOUNTER — OFFICE VISIT (OUTPATIENT)
Dept: SURGERY | Facility: CLINIC | Age: 45
End: 2025-01-16
Payer: COMMERCIAL

## 2025-01-16 VITALS
SYSTOLIC BLOOD PRESSURE: 127 MMHG | HEART RATE: 76 BPM | HEIGHT: 62 IN | BODY MASS INDEX: 24.26 KG/M2 | WEIGHT: 131.81 LBS | DIASTOLIC BLOOD PRESSURE: 94 MMHG

## 2025-01-16 DIAGNOSIS — K62.5 BRBPR (BRIGHT RED BLOOD PER RECTUM): ICD-10-CM

## 2025-01-16 DIAGNOSIS — K64.1 GRADE II HEMORRHOIDS: Primary | ICD-10-CM

## 2025-01-16 PROCEDURE — 3008F BODY MASS INDEX DOCD: CPT | Mod: CPTII,S$GLB,, | Performed by: NURSE PRACTITIONER

## 2025-01-16 PROCEDURE — 3080F DIAST BP >= 90 MM HG: CPT | Mod: CPTII,S$GLB,, | Performed by: NURSE PRACTITIONER

## 2025-01-16 PROCEDURE — 1160F RVW MEDS BY RX/DR IN RCRD: CPT | Mod: CPTII,S$GLB,, | Performed by: NURSE PRACTITIONER

## 2025-01-16 PROCEDURE — 3074F SYST BP LT 130 MM HG: CPT | Mod: CPTII,S$GLB,, | Performed by: NURSE PRACTITIONER

## 2025-01-16 PROCEDURE — 99204 OFFICE O/P NEW MOD 45 MIN: CPT | Mod: 25,S$GLB,, | Performed by: NURSE PRACTITIONER

## 2025-01-16 PROCEDURE — 46221 LIGATION OF HEMORRHOID(S): CPT | Mod: S$GLB,,, | Performed by: NURSE PRACTITIONER

## 2025-01-16 PROCEDURE — 99999 PR PBB SHADOW E&M-EST. PATIENT-LVL V: CPT | Mod: PBBFAC,,, | Performed by: NURSE PRACTITIONER

## 2025-01-16 PROCEDURE — 1159F MED LIST DOCD IN RCRD: CPT | Mod: CPTII,S$GLB,, | Performed by: NURSE PRACTITIONER

## 2025-01-16 NOTE — PROGRESS NOTES
"CRS Office Visit History and Physical    Referring Md:   Aletha Richardson Md  [address] [place] [address]    SUBJECTIVE:     Chief Complaint: rectal bleeding, rectal pressure    History of Present Illness:  The patient is a new patient to this practice.   Course is as follows:  Patient is a 44 y.o. female presents with rectal bleeding and discomfort  Saw Dr Malhotra in , tolerated RBL well  She still gets bleeding, bleeds through clothes. Occurs, weekly or every other week. Started two years ago. Does not occur with every BM  When she stressed she gets a lot of pressure,   Can go days without a BM  Takes stool softeners      Last Colonoscopy:   - Redundant colon which is a result of chronic                          constipation.                          - Internal hemorrhoids.                          - Perianal skin tags found on perianal exam which                          accounts for the "extra" tissue she feels.                          - Diverticulosis in the sigmoid colon.                          - No specimens collected.                          - Nothing found to account for symptoms, I am                          suspicious for rectal prolapse.   Family history of colorectal cancer or IBD: son with Crohns disease.    Review of patient's allergies indicates:  No Known Allergies    Past Medical History:   Diagnosis Date    Abnormal Pap smear of cervix     cyro done, nl since years ago,  LGSIL + HPV 16 COLPO DONE.    Abnormal uterine bleeding (AUB) 10/23/2019    Arthritis     Menorrhagia 2019    Menorrhagia with regular cycle 2019    - followed by Gynecology and plan for endometrial ablation    Mitral valve prolapse     Pregnancy         Psoriasis      Past Surgical History:   Procedure Laterality Date    COLONOSCOPY N/A 6/3/2021    Procedure: COLONOSCOPY;  Surgeon: Alexa Palacios MD;  Location: [facility];  Service: Endoscopy;  Laterality: N/A;    COLPOSCOPY      " "remote    DILATION AND CURETTAGE OF UTERUS N/A 10/4/2019    Procedure: DILATION AND CURETTAGE, UTERUS;  Surgeon: Karlene Chavez MD;  Location: [facility];  Service: OB/GYN;  Laterality: N/A;    ENDOMETRIAL ABLATION      RHINOPLASTY      THERMAL ABLATION OF ENDOMETRIUM USING HYSTEROSCOPY N/A 10/4/2019    Procedure: ABLATION, ENDOMETRIUM, THERMAL, HYSTEROSCOPIC;  Surgeon: Karlene Chavez MD;  Location: [facility];  Service: OB/GYN;  Laterality: N/A;    TONSILLECTOMY      TYMPANOSTOMY TUBE PLACEMENT       Family History   Problem Relation Name Age of Onset    Hypertension Mother      Hyperlipidemia Father      No Known Problems Sister      Epilepsy Daughter      No Known Problems Son      No Known Problems Maternal Grandmother      No Known Problems Maternal Grandfather      No Known Problems Paternal Grandmother      No Known Problems Paternal Grandfather      Breast cancer Neg Hx      Colon cancer Neg Hx      Ovarian cancer Neg Hx       Social History     Tobacco Use    Smoking status: Former     Current packs/day: 0.00     Average packs/day: 0.5 packs/day for 20.0 years (10.0 ttl pk-yrs)     Types: Cigarettes, Vaping with nicotine     Start date: 2002     Quit date: 2022     Years since quittin.8    Smokeless tobacco: Never    Tobacco comments:     Vaping daily   Substance Use Topics    Alcohol use: Yes     Alcohol/week: 2.0 standard drinks of alcohol     Types: 1 Cans of beer, 1 Shots of liquor per week     Comment: socially    Drug use: Never        Review of Systems:  ROS    OBJECTIVE:     Vital Signs (Most Recent)  BP (!) 127/94   Pulse 76   Ht 5' 2" (1.575 m)   Wt 59.8 kg (131 lb 13.4 oz)   BMI 24.11 kg/m²     Physical Exam:  General: White female in no distress   Neuro: Alert and oriented to person, place, and time.  Moves all extremities.     HEENT: No icterus.  Trachea midline  Respiratory: Respirations are even and unlabored, no cough or audible wheezing  Skin: Warm dry and intact, No " visible rashes, no jaundice    Labs reviewed today:  Lab Results   Component Value Date    WBC 5.32 10/29/2024    HGB 14.3 10/29/2024    HCT 42.8 10/29/2024     10/29/2024    CHOL 167 10/29/2024    TRIG 64 10/29/2024    HDL 78 (H) 10/29/2024    ALT 14 10/29/2024    AST 17 10/29/2024     10/29/2024    K 3.5 10/29/2024     10/29/2024    CREATININE 0.8 10/29/2024    BUN 10 10/29/2024    CO2 24 10/29/2024    TSH 0.128 (L) 10/30/2023       Imaging reviewed today:  10/2021  CT scan for LLQ abd pain  Findings consistent with acute diverticulitis of the sigmoid colon.     2.2 cm hypodensity adjacent to the sigmoid colon in the expected location of the left ovary favored to be ovarian in origin cannot exclude a small abscess.    Endoscopy reviewed today:  See HPI    Anorectal Exam:    Anal Skin: Normal but w eversion of anus LL prolapse hemorrhoid    Digital Rectal Exam:  Resting Tone normal  Mass LL hemorrhoid  Tenderness  absent    Anoscopy:  Verbal consent was obtained.   Clear plastic anoscope inserted.    Hemorrhoids  present  Stigmata of bleeding  present  Stigmata of prolapsed  present  Distal rectal mucosa normal    Rubber Band Ligation:  Benefits vs risks discussed, she is agreeable to procedure  Suction applicator was placed above the dentate line.   Rubber bands were deployed in the LL and RP position.    Patient tolerated the procedure well.       ASSESSMENT/PLAN:     Diagnoses and all orders for this visit:    BRBPR (bright red blood per rectum)  -     Ambulatory referral/consult to Gastroenterology      45 yo F here with prolapsing bleeding hemorrhoids, tolerated RBL today well. She has had RBL in 2021 as well. Her LL hemorrhoid is big so may need serial banding.   For now we agreed to see what this RBL does for her and if needing second treatment she will let me know.  Post RBL instructions discussed and printed hand out given    The patient was instructed to:  Instructions following  hemorrhoidal banding    DIET:  High fiber diet to keep stool soft and to prevent constipation. Eat plenty of fruits and vegetables. Drink 8-10 glasses of water per day. Salads with raw veggies or whole grains are good options    Stool  (fiber supplement):  Metamucil, citrucel , fibercon, or benefiber daily. Make sure to drink plenty of water with the fiber    Pain control:  Sit in a warm tub of water to relieve minor discomfort. Tylenol can be taken for moderate pain. Do not take aspirin for 10 days following procedure unless ordered by your physician.     Complications:  1) A small amount of bright red blood can be expected. If bleeding persists or if it is greater greater than 1 cup of blood call your doctor.   2) If you have severe pain, fever greater than 101 or if you are unable to urinate within 6 hours of following hemorrhoidal treatment, call your doctor    Phone numbers: [facility] clinic [phone], 8 am to 5pm Mon- Friday. If you have problems with pain, bleeding, fever, or problems urinating after 5 pm please call [phone] and ask for the doctor on call for the colon and rectal department.     F/u PRN  BLANCA Zeng  Colon and Rectal Surgery

## 2025-01-16 NOTE — PATIENT INSTRUCTIONS
Women need 25 grams of fiber per day, and men need 38 grams per day, according to the [employer].        If you're not meeting your recommended daily dose of fiber via food, fiber supplementation is beneficial in helping meet those daily recommendations. Be sure to drink plenty of water while taking fiber supplementation. Make sure to read fiber supplementation drug label directions regarding when and how to take the supplementation.    Dietary fiber content of frequently consumed foods  Food Fiber, g/serving   Fruits   Apple (with skin) 3.5/1 medium-sized apple   Apricot (fresh) 1.8/3 apricots   Banana 2.5/1 banana   Cantaloupe 2.7/half edible portion   Dates 13.5/1 cup (chopped)   Grapefruit 1.6/half edible portion   Grapes 2.6/10 grapes   Oranges 2.6/1 orange   Peach (with skin) 2.1/1 peach   Pear (with skin) 4.6/1 pear   Pineapple 2.2/1 cup (diced)   Prunes 11.9/11 dried prunes   Raisins 2.2/packet   Strawberries 3.0/1 cup   Juices   Apple 0.74/1 cup   Grapefruit 1.0/1 cup   Grape 1.3/1 cup   Orange 1.0/1 cup   Vegetables   Cooked   Asparagus 1.5/7 garcia   Beans, string, green 3.4/1 cup   Broccoli 5.0/1 stalk   [place] sprouts 4.6/7-8 sprouts   Cabbage 2.9/1 cup (cooked)   Carrots 4.6/1 cup   Cauliflower 2.1/1 cup   Peas 7.2/1 cup (cooked)   Potato (with skin) 2.3/1 boiled   Spinach 4.1/1 cup (raw)   Squash, summer 3.4/1 cup (cooked, diced)   Sweet potatoes 2.7/1 baked   Zucchini 4.2/1 cup (cooked, diced)   Raw   Cucumber 0.2/6-8 slices with skin   Lettuce 2.0/1 wedge iceberg   Mushrooms 0.8/half cup (sliced)   Onions 1.3/1 cup   Peppers, green 1.0/1 pod   Tomato 1.8/1 tomato   Spinach 8.0/1 cup (chopped)   Legumes   Baked beans 18.6/1 cup   Dried peas 4.7/half cup (cooked)   Kidney beans 7.4/half cup (cooked)   Lima beans 2.6/half cup (cooked)   Lentils 1.9/half cup (cooked)   Breads, pastas, and flours   Bagels 1.1/half bagel   Bran muffins 6.3/muffin   Cracked wheat 4.1/slice   Oatmeal 5.3/1 cup    Pumpernickel bread 1.0/slice   White bread 0.55/slice   Whole-wheat bread 1.66/slice   Pasta and rice cooked   Macaroni 1.0/1 cup (cooked)   Rice, brown 2.4/1 cup (cooked)   Rice, polished 0.6/1 cup (cooked)   Spaghetti (regular) 1.0/1 cup (cooked)   Flours and grains   Bran, oat 8.3/oz   Bran, wheat 12.4/oz   Rolled oats 13.7/1 cup (cooked)   Nuts   Almonds 3.6/half cup (slivered)   Peanuts 11.7/1 cup       Instructions following hemorrhoidal banding    DIET:  High fiber diet to keep stool soft and to prevent constipation. Eat plenty of fruits and vegetables. Drink 8-10 glasses of water per day. Salads with raw veggies or whole grains are good options    Stool  (fiber supplement):  Metamucil, citrucel , fibercon, or benefiber daily. Make sure to drink plenty of water with the fiber    Pain control:  Sit in a warm tub of water to relieve minor discomfort. Tylenol can be taken for moderate pain. Do not take aspirin for 10 days following procedure unless ordered by your physician.     Complications:  1) A small amount of bright red blood can be expected. If bleeding persists or if it is greater greater than 1 cup of blood call your doctor.   2) If you have severe pain, fever greater than 101 or if you are unable to urinate within 6 hours of following hemorrhoidal treatment, call your doctor    Phone numbers: [facility] clinic [phone], 8 am to 5pm Mon- Friday. If you have problems with pain, bleeding, fever, or problems urinating after 5 pm please call [phone] and ask for the doctor on call for the colon and rectal department.

## 2025-01-28 ENCOUNTER — PATIENT MESSAGE (OUTPATIENT)
Dept: FAMILY MEDICINE | Facility: CLINIC | Age: 45
End: 2025-01-28
Payer: COMMERCIAL

## 2025-01-28 DIAGNOSIS — Z12.31 SCREENING MAMMOGRAM FOR BREAST CANCER: Primary | ICD-10-CM

## 2025-01-30 ENCOUNTER — PROCEDURE VISIT (OUTPATIENT)
Dept: OBSTETRICS AND GYNECOLOGY | Facility: CLINIC | Age: 45
End: 2025-01-30
Payer: COMMERCIAL

## 2025-01-30 VITALS
HEIGHT: 62 IN | DIASTOLIC BLOOD PRESSURE: 80 MMHG | WEIGHT: 127.38 LBS | SYSTOLIC BLOOD PRESSURE: 126 MMHG | BODY MASS INDEX: 23.44 KG/M2 | HEART RATE: 74 BPM

## 2025-01-30 DIAGNOSIS — Z01.812 PRE-PROCEDURE LAB EXAM: ICD-10-CM

## 2025-01-30 DIAGNOSIS — R87.610 PAP SMEAR ABNORMALITY OF CERVIX WITH ASCUS FAVORING BENIGN: Primary | ICD-10-CM

## 2025-01-30 LAB
B-HCG UR QL: NEGATIVE
CTP QC/QA: YES

## 2025-01-30 PROCEDURE — 57455 BIOPSY OF CERVIX W/SCOPE: CPT | Mod: S$GLB,,, | Performed by: OBSTETRICS & GYNECOLOGY

## 2025-01-30 PROCEDURE — 81025 URINE PREGNANCY TEST: CPT | Mod: S$GLB,,, | Performed by: OBSTETRICS & GYNECOLOGY

## 2025-01-30 NOTE — PROCEDURES
Colposcopy    Date/Time: 1/30/2025 1:00 PM    Performed by: Karlene Chavez MD  Authorized by: Karlene Chavez MD    Consent obatined:  Prior to procedure the appropriate consent was completed and verified  Timeout:Immediately prior to procedure a time out was called to verify the correct patient, procedure, equipment, support staff and site/side marked as required    Colposcopy Site:  Cervix and Vagina  Acrowhite Lesion? Yes    Atypical Vessels: No    Transformation Zone Adequate?: Yes    Biopsy?: Yes         Location:  Cervix ((2 00))  ECC Performed?: No    Estimated blood loss (cc):  1   Patient tolerated the procedure well with no immediate complications.   Post-operative instructions were provided for the patient.   Patient was discharged and will follow up if any complications occur

## 2025-01-31 ENCOUNTER — OFFICE VISIT (OUTPATIENT)
Dept: FAMILY MEDICINE | Facility: CLINIC | Age: 45
End: 2025-01-31
Payer: COMMERCIAL

## 2025-01-31 VITALS
OXYGEN SATURATION: 99 % | WEIGHT: 127.31 LBS | BODY MASS INDEX: 23.43 KG/M2 | DIASTOLIC BLOOD PRESSURE: 80 MMHG | HEART RATE: 75 BPM | SYSTOLIC BLOOD PRESSURE: 106 MMHG | HEIGHT: 62 IN

## 2025-01-31 DIAGNOSIS — F41.9 ANXIETY: ICD-10-CM

## 2025-01-31 DIAGNOSIS — Z00.00 HEALTHCARE MAINTENANCE: Primary | ICD-10-CM

## 2025-01-31 PROCEDURE — 99214 OFFICE O/P EST MOD 30 MIN: CPT | Mod: S$GLB,,, | Performed by: STUDENT IN AN ORGANIZED HEALTH CARE EDUCATION/TRAINING PROGRAM

## 2025-01-31 PROCEDURE — 1159F MED LIST DOCD IN RCRD: CPT | Mod: CPTII,S$GLB,, | Performed by: STUDENT IN AN ORGANIZED HEALTH CARE EDUCATION/TRAINING PROGRAM

## 2025-01-31 PROCEDURE — 3008F BODY MASS INDEX DOCD: CPT | Mod: CPTII,S$GLB,, | Performed by: STUDENT IN AN ORGANIZED HEALTH CARE EDUCATION/TRAINING PROGRAM

## 2025-01-31 PROCEDURE — 99999 PR PBB SHADOW E&M-EST. PATIENT-LVL IV: CPT | Mod: PBBFAC,,, | Performed by: STUDENT IN AN ORGANIZED HEALTH CARE EDUCATION/TRAINING PROGRAM

## 2025-01-31 PROCEDURE — 3074F SYST BP LT 130 MM HG: CPT | Mod: CPTII,S$GLB,, | Performed by: STUDENT IN AN ORGANIZED HEALTH CARE EDUCATION/TRAINING PROGRAM

## 2025-01-31 PROCEDURE — 1160F RVW MEDS BY RX/DR IN RCRD: CPT | Mod: CPTII,S$GLB,, | Performed by: STUDENT IN AN ORGANIZED HEALTH CARE EDUCATION/TRAINING PROGRAM

## 2025-01-31 PROCEDURE — 3079F DIAST BP 80-89 MM HG: CPT | Mod: CPTII,S$GLB,, | Performed by: STUDENT IN AN ORGANIZED HEALTH CARE EDUCATION/TRAINING PROGRAM

## 2025-01-31 RX ORDER — IBUPROFEN 100 MG/5ML
SUSPENSION, ORAL (FINAL DOSE FORM) ORAL
COMMUNITY
Start: 2023-01-01

## 2025-01-31 NOTE — ASSESSMENT & PLAN NOTE
Assessment:  Generalized anxiety disorder, improved on Celexa 10 mg daily.  No reported side effects, good tolerability.  Plan:  Continue Celexa 10 mg daily.  Monitor for continued efficacy and tolerability.  Encourage lifestyle modifications (exercise, sleep hygiene, therapy if needed).

## 2025-01-31 NOTE — PROGRESS NOTES
Patient ID: Awilda Mckeon is a 44 y.o. female.    Chief Complaint: Follow-up (1 month)    History of Present Illness    CHIEF COMPLAINT:  Awilda presents today for follow up.    GYNECOLOGIC:  She had an abnormal pap smear requiring a biopsy which was performed yesterday. Results are pending.    SURGICAL HISTORY:  She recently underwent hemorrhoid banding procedure for two hemorrhoids.    MEDICATIONS:  She reports her current medication regimen is effective for symptom management, though occasionally considers dose increase.      ROS:  General: -fever, -chills, -fatigue, -weight gain, -weight loss  Eyes: -vision changes, -redness, -discharge  ENT: -ear pain, -nasal congestion, -sore throat  Cardiovascular: -chest pain, -palpitations, -lower extremity edema  Respiratory: -cough, -shortness of breath  Gastrointestinal: -abdominal pain, -nausea, -vomiting, -diarrhea, -constipation, -blood in stool  Genitourinary: -dysuria, -hematuria, -frequency  Musculoskeletal: -joint pain, -muscle pain  Skin: -rash, -lesion  Neurological: -headache, -dizziness, -numbness, -tingling  Psychiatric: -anxiety, -depression, -sleep difficulty         Physical Exam    General: No acute distress. Well-developed. Well-nourished.  Eyes: EOMI. Sclerae anicteric.  HENT: Normocephalic. Atraumatic. Nares patent. Moist oral mucosa.  Ears: Bilateral TMs clear. Bilateral EACs clear.  Cardiovascular: Regular rate. Regular rhythm. No murmurs. No rubs. No gallops. Normal S1, S2.  Respiratory: Normal respiratory effort. Clear to auscultation bilaterally. No rales. No rhonchi. No wheezing.  Abdomen: Soft. Non-tender. Non-distended. Normoactive bowel sounds.  Musculoskeletal: No  obvious deformity.  Extremities: No lower extremity edema.  Neurological: Alert & oriented x3. No slurred speech. Normal gait.  Psychiatric: Normal mood. Normal affect. Good insight. Good judgment.  Skin: Warm. Dry. No rash.         Assessment & Plan    PSA (PSORIATIC  ARTHRITIS):  - Assessed patient's current medication regimen for psoriasis and determined it to be effective.  - No changes necessary at this time, but discussed potential for future dosage adjustment if needed.  - Scheduled a follow-up visit in 6 months to ensure stability of the condition.    ABNORMAL PAP SMEAR:  - Noted the patient's recent abnormal Pap smear result.  - Dr. Chavez performed a cervical biopsy.  - Awaiting biopsy results for further evaluation and treatment planning.    HEMORRHOIDS:  - Awilda visited a surgeon who performed hemorrhoid banding on 2 hemorrhoids.  - Recovery is progressing as expected.  - Scheduled a follow-up visit to evaluate the procedure's long-term effectiveness.    FOLLOW UP:  - Contact the office if any issues arise before scheduled follow-up.       1. Healthcare maintenance  -     CBC Auto Differential; Future; Expected date: 01/31/2026  -     Comprehensive Metabolic Panel; Future; Expected date: 01/31/2026  -     Lipid Panel; Future; Expected date: 01/31/2026  -     TSH; Future; Expected date: 01/31/2026    2. Anxiety  Assessment & Plan:  Assessment:  Generalized anxiety disorder, improved on Celexa 10 mg daily.  No reported side effects, good tolerability.  Plan:  Continue Celexa 10 mg daily.  Monitor for continued efficacy and tolerability.  Encourage lifestyle modifications (exercise, sleep hygiene, therapy if needed).                No follow-ups on file.    This note was generated with the assistance of ambient listening technology. Verbal consent was obtained by the patient and accompanying visitor(s) for the recording of patient appointment to facilitate this note. I attest to having reviewed and edited the generated note for accuracy, though some syntax or spelling errors may persist. Please contact the author of this note for any clarification.

## 2025-02-03 LAB
FINAL PATHOLOGIC DIAGNOSIS: NORMAL
GROSS: NORMAL
Lab: NORMAL

## 2025-02-17 ENCOUNTER — PATIENT MESSAGE (OUTPATIENT)
Dept: RHEUMATOLOGY | Facility: CLINIC | Age: 45
End: 2025-02-17
Payer: COMMERCIAL

## 2025-03-11 ENCOUNTER — PATIENT MESSAGE (OUTPATIENT)
Dept: ADMINISTRATIVE | Facility: OTHER | Age: 45
End: 2025-03-11
Payer: COMMERCIAL

## 2025-04-03 ENCOUNTER — PATIENT MESSAGE (OUTPATIENT)
Dept: ADMINISTRATIVE | Facility: OTHER | Age: 45
End: 2025-04-03
Payer: COMMERCIAL

## 2025-04-22 ENCOUNTER — PATIENT MESSAGE (OUTPATIENT)
Dept: FAMILY MEDICINE | Facility: CLINIC | Age: 45
End: 2025-04-22
Payer: COMMERCIAL

## 2025-04-27 RX ORDER — FLUOXETINE 10 MG/1
10 CAPSULE ORAL DAILY
Qty: 30 CAPSULE | Refills: 11 | Status: SHIPPED | OUTPATIENT
Start: 2025-04-27 | End: 2026-04-27

## 2025-04-28 NOTE — TELEPHONE ENCOUNTER
I called in fluoxetine, which has less of a weight positive effect. If still having issues, please let me know

## 2025-05-22 DIAGNOSIS — D84.821 IMMUNODEFICIENCY DUE TO TREATMENT WITH IMMUNOSUPPRESSIVE MEDICATION: ICD-10-CM

## 2025-05-22 DIAGNOSIS — L40.50 PSA (PSORIATIC ARTHRITIS): Primary | ICD-10-CM

## 2025-05-22 DIAGNOSIS — Z79.60 IMMUNODEFICIENCY DUE TO TREATMENT WITH IMMUNOSUPPRESSIVE MEDICATION: ICD-10-CM

## 2025-05-22 DIAGNOSIS — T45.1X5A IMMUNODEFICIENCY DUE TO TREATMENT WITH IMMUNOSUPPRESSIVE MEDICATION: ICD-10-CM

## 2025-05-29 ENCOUNTER — OFFICE VISIT (OUTPATIENT)
Dept: RHEUMATOLOGY | Facility: CLINIC | Age: 45
End: 2025-05-29
Payer: COMMERCIAL

## 2025-05-29 VITALS
BODY MASS INDEX: 22.92 KG/M2 | OXYGEN SATURATION: 99 % | DIASTOLIC BLOOD PRESSURE: 83 MMHG | HEIGHT: 62 IN | SYSTOLIC BLOOD PRESSURE: 124 MMHG | HEART RATE: 69 BPM | RESPIRATION RATE: 19 BRPM | WEIGHT: 124.56 LBS

## 2025-05-29 DIAGNOSIS — L40.50 PSA (PSORIATIC ARTHRITIS): ICD-10-CM

## 2025-05-29 DIAGNOSIS — T45.1X5A IMMUNODEFICIENCY DUE TO TREATMENT WITH IMMUNOSUPPRESSIVE MEDICATION: Primary | ICD-10-CM

## 2025-05-29 DIAGNOSIS — D84.821 IMMUNODEFICIENCY DUE TO TREATMENT WITH IMMUNOSUPPRESSIVE MEDICATION: Primary | ICD-10-CM

## 2025-05-29 DIAGNOSIS — Z79.60 IMMUNODEFICIENCY DUE TO TREATMENT WITH IMMUNOSUPPRESSIVE MEDICATION: Primary | ICD-10-CM

## 2025-05-29 DIAGNOSIS — L73.2 HIDRADENITIS SUPPURATIVA: ICD-10-CM

## 2025-05-29 DIAGNOSIS — Z71.89 COUNSELING AND COORDINATION OF CARE: ICD-10-CM

## 2025-05-29 PROCEDURE — 3008F BODY MASS INDEX DOCD: CPT | Mod: CPTII,S$GLB,, | Performed by: STUDENT IN AN ORGANIZED HEALTH CARE EDUCATION/TRAINING PROGRAM

## 2025-05-29 PROCEDURE — 99999 PR PBB SHADOW E&M-EST. PATIENT-LVL IV: CPT | Mod: PBBFAC,,, | Performed by: STUDENT IN AN ORGANIZED HEALTH CARE EDUCATION/TRAINING PROGRAM

## 2025-05-29 PROCEDURE — G2211 COMPLEX E/M VISIT ADD ON: HCPCS | Mod: S$GLB,,, | Performed by: STUDENT IN AN ORGANIZED HEALTH CARE EDUCATION/TRAINING PROGRAM

## 2025-05-29 PROCEDURE — 99214 OFFICE O/P EST MOD 30 MIN: CPT | Mod: S$GLB,,, | Performed by: STUDENT IN AN ORGANIZED HEALTH CARE EDUCATION/TRAINING PROGRAM

## 2025-05-29 PROCEDURE — 3079F DIAST BP 80-89 MM HG: CPT | Mod: CPTII,S$GLB,, | Performed by: STUDENT IN AN ORGANIZED HEALTH CARE EDUCATION/TRAINING PROGRAM

## 2025-05-29 PROCEDURE — 1159F MED LIST DOCD IN RCRD: CPT | Mod: CPTII,S$GLB,, | Performed by: STUDENT IN AN ORGANIZED HEALTH CARE EDUCATION/TRAINING PROGRAM

## 2025-05-29 PROCEDURE — 3074F SYST BP LT 130 MM HG: CPT | Mod: CPTII,S$GLB,, | Performed by: STUDENT IN AN ORGANIZED HEALTH CARE EDUCATION/TRAINING PROGRAM

## 2025-05-29 RX ORDER — LANOLIN ALCOHOL/MO/W.PET/CERES
1000 CREAM (GRAM) TOPICAL DAILY
COMMUNITY

## 2025-05-29 RX ORDER — ADALIMUMAB 40MG/0.4ML
40 KIT SUBCUTANEOUS
Qty: 3 PEN | Refills: 11 | Status: SHIPPED | OUTPATIENT
Start: 2025-05-29 | End: 2026-05-24

## 2025-05-29 NOTE — PROGRESS NOTES
RHEUMATOLOGY OUTPATIENT CLINIC NOTE    5/29/2025    Attending Rheumatologist: Daisha Luong  Primary Care Provider: Aletha Richardson MD   Physician Requesting Consultation: No referring provider defined for this encounter.  Chief Complaint/Reason For Consultation:  Follow-up        Subjective:       HPI  Awilda Mckeon is a 44 y.o. White female with medical history noted below who presents for evaluation of PsA.    Interim history  -last visit in 11/2024  -she is on Humira every 2 weeks. In February, she had some worsening of her symptoms but she thinks it was related to malfunction of auto-injector. She feels that humira is lasting 10 days and then her joint pain worsens.   -Recent labs showed unremarkable CBC and CMP. ESR and CRP normal  -HS is controlled. She self stopped doxycycline months ago with no flares.       Disease history     Used to follow with Dr. Bruno at [place]. Last visit on 4/2023   She notes being diagnosed at the age of 34 with PsA, PsO started in high school. MCP swelling, dactylitis, rash, +FHX. She has tried MTX and SSZ, suffered from Nausea, but did note improvement. Patient endorses worse joints to be hands, knees, feet, elbow, wrist. +Swelling, Dactylitis. Morning stiffness lasting hours. Had baker cyst rupture with left leg swelling in June has improved. +Bloody stools (Hx of hemorrhoids), Genital PsO, Nail changes, +FHX, Night sweats, fatigue. No EYE inflammation, IBP, Devi, wt loss.   LEF caused changes in her mood  Humira started a year ago      Review of Systems   Constitutional:  Negative for fever and unexpected weight change.   HENT:  Negative for mouth sores and trouble swallowing.    Eyes:  Negative for redness.   Respiratory:  Negative for cough and shortness of breath.    Cardiovascular:  Negative for chest pain.   Gastrointestinal:  Negative for constipation and diarrhea.   Genitourinary:  Negative for dysuria and genital sores.   Integumentary:   Negative for rash.   Neurological:  Negative for headaches.   Hematological:  Does not bruise/bleed easily.        Chronic comorbid conditions affecting medical decision making today:  Past Medical History:   Diagnosis Date    Abnormal Pap smear of cervix     cyro done, nl since years ago,  LGSIL + HPV 16 COLPO DONE.    Abnormal uterine bleeding (AUB) 10/23/2019    Arthritis     Menorrhagia 2019    Menorrhagia with regular cycle 2019    - followed by Gynecology and plan for endometrial ablation    Mitral valve prolapse     Pregnancy         Psoriasis      Past Surgical History:   Procedure Laterality Date    COLONOSCOPY N/A 6/3/2021    Procedure: COLONOSCOPY;  Surgeon: Alexa Palacios MD;  Location: [facility];  Service: Endoscopy;  Laterality: N/A;    COLPOSCOPY      remote    DILATION AND CURETTAGE OF UTERUS N/A 10/4/2019    Procedure: DILATION AND CURETTAGE, UTERUS;  Surgeon: Karlene Chavez MD;  Location: [facility] OR;  Service: OB/GYN;  Laterality: N/A;    ENDOMETRIAL ABLATION      RHINOPLASTY      THERMAL ABLATION OF ENDOMETRIUM USING HYSTEROSCOPY N/A 10/4/2019    Procedure: ABLATION, ENDOMETRIUM, THERMAL, HYSTEROSCOPIC;  Surgeon: Karlene Chavez MD;  Location: [facility] OR;  Service: OB/GYN;  Laterality: N/A;    TONSILLECTOMY      TYMPANOSTOMY TUBE PLACEMENT       Family History   Problem Relation Name Age of Onset    Hypertension Mother      Hyperlipidemia Father      Nephrolithiasis Sister      Diverticulitis Sister      Epilepsy Daughter      Crohn's disease Son      No Known Problems Maternal Grandmother      No Known Problems Maternal Grandfather      No Known Problems Paternal Grandmother      No Known Problems Paternal Grandfather      Breast cancer Neg Hx      Colon cancer Neg Hx      Ovarian cancer Neg Hx       Social History     Substance and Sexual Activity   Alcohol Use Yes    Alcohol/week: 2.0 standard drinks of alcohol    Types: 1 Cans of beer, 1 Shots of liquor per week     Comment: socially     Social History     Tobacco Use   Smoking Status Every Day    Current packs/day: 0.00    Average packs/day: 0.5 packs/day for 20.0 years (10.0 ttl pk-yrs)    Types: Cigarettes, Vaping with nicotine    Start date: 03/2002    Last attempt to quit: 03/2022    Years since quitting: 3.2   Smokeless Tobacco Never   Tobacco Comments    Vaping daily     Social History     Substance and Sexual Activity   Drug Use Never       Current Outpatient Medications:     ascorbic acid, vitamin C, (VITAMIN C) 1000 MG tablet, , Disp: , Rfl:     cyanocobalamin (VITAMIN B-12) 1000 MCG tablet, Take 1,000 mcg by mouth once daily., Disp: , Rfl:     ferrous sulfate (FEOSOL) 325 mg (65 mg iron) Tab tablet, Take 325 mg by mouth daily with breakfast., Disp: , Rfl:     FLUoxetine 10 MG capsule, Take 1 capsule (10 mg total) by mouth once daily., Disp: 30 capsule, Rfl: 11    microfibrillar collagen powder, Apply 1 g topically as needed., Disp: , Rfl:     vitamin D (VITAMIN D3) 1000 units Tab, Take 1,000 Units by mouth once daily., Disp: , Rfl:     adalimumab (HUMIRA,CF, PEN) 40 mg/0.4 mL PnKt, Inject 0.4 mLs (40 mg total) into the skin every 10 days., Disp: 3 pen , Rfl: 11    Current Facility-Administered Medications:     etonogestreL subdermal device 68 mg, 68 mg, Implant, , Karlene Chavez MD, 68 mg at 11/10/23 1245     Objective:         Vitals:    05/29/25 1328   BP: 124/83   Pulse: 69   Resp: 19     Physical Exam  Can make fist, no synovitis   Knee crepitus  Negative ankle/MTP  Lumbar extension/flexion ok    Reviewed old and all outside pertinent medical records available.    All lab results personally reviewed and interpreted by me.  Lab Results   Component Value Date    WBC 4.89 05/22/2025    HGB 14.8 05/22/2025    HCT 44.6 05/22/2025    MCV 89 05/22/2025    MCH 29.5 05/22/2025    MCHC 33.2 05/22/2025    RDW 12.2 05/22/2025     05/22/2025    MPV 10.3 05/22/2025       Lab Results   Component Value Date    NA  "139 05/22/2025    K 3.8 05/22/2025     05/22/2025    CO2 24 05/22/2025    GLU 83 05/22/2025    BUN 9 05/22/2025    CALCIUM 9.7 05/22/2025    PROT 7.3 05/22/2025    ALBUMIN 4.3 05/22/2025    BILITOT 0.6 05/22/2025    AST 15 05/22/2025    ALKPHOS 33 (L) 05/22/2025    ALT 12 05/22/2025       Lab Results   Component Value Date    COLORU Yellow 09/22/2022    APPEARANCEUA Hazy (A) 10/08/2021    SPECGRAV 1.005 09/22/2022    PHUR 7 09/22/2022    PROTEINUA Negative 10/08/2021    KETONESU neg 09/22/2022    LEUKOCYTESUR Negative 10/08/2021    NITRITE neg 09/22/2022    UROBILINOGEN neg 09/22/2022       Lab Results   Component Value Date    CRP <0.3 05/22/2025       Lab Results   Component Value Date    SEDRATE 1 05/22/2025       Lab Results   Component Value Date    RF 47.0 (H) 08/18/2022    SEDRATE 1 05/22/2025       No components found for: "25OHVITDTOT", "46ZTCUFI6", "92ISOWKA7", "METHODNOTE"    Lab Results   Component Value Date    URICACID 4.7 08/18/2022       No components found for: "TSPOTTB"      Imaging:  All imaging reviewed and independently interpreted by me.       ASSESSMENT / PLAN:     Awilda Mckeon is a 44 y.o. White female with:    1. Psoriatic arthritis - active and progressing   - patient with PsA, +synovitis, dactylitis  - has been doing overall ok but humira is only lasting 10 days   - change humira 40 mg SC to every 10 days now   - reassurance    2. Hidradenitis suppurativa - chronic and stable   - she has been having intermittent boils in both groin areas.   - if worsening, may consider changing humira to cosentyx.     3. Drug induced immunodeficiency - stable   - labs in 5/2025 were stable.   - Pregnancy Counseling (she had ablation)   - no live vaccines  - vaccines per guidelines  - immunosuppression/infectious precautions discussed     4. Other specified counseling  - over 10 minutes spent regarding below topics:  - Nutrition and exercise counseling.  - Limitation of alcohol consumption.  - " Regular exercise:  Aerobic and resistance.  - Medication counseling provided.    Follow up in about 6 months (around 11/29/2025). Or sooner PRN for disease management     Method of contact with patient concerns: [facility]    Disclaimer:  This note is prepared using voice recognition software and as such is likely to have errors and has not been proof read. Please contact me for questions.     Daisha Luong M.D.  Rheumatology Department   [facility]

## 2025-07-09 ENCOUNTER — PATIENT MESSAGE (OUTPATIENT)
Dept: ADMINISTRATIVE | Facility: OTHER | Age: 45
End: 2025-07-09
Payer: COMMERCIAL

## 2025-07-27 ENCOUNTER — PATIENT MESSAGE (OUTPATIENT)
Dept: ADMINISTRATIVE | Facility: OTHER | Age: 45
End: 2025-07-27
Payer: COMMERCIAL

## 2025-08-14 ENCOUNTER — PATIENT MESSAGE (OUTPATIENT)
Dept: ADMINISTRATIVE | Facility: OTHER | Age: 45
End: 2025-08-14
Payer: COMMERCIAL

## 2025-08-19 ENCOUNTER — OFFICE VISIT (OUTPATIENT)
Dept: FAMILY MEDICINE | Facility: CLINIC | Age: 45
End: 2025-08-19
Payer: COMMERCIAL

## 2025-08-19 VITALS
WEIGHT: 130.19 LBS | DIASTOLIC BLOOD PRESSURE: 84 MMHG | HEIGHT: 62 IN | HEART RATE: 70 BPM | SYSTOLIC BLOOD PRESSURE: 118 MMHG | OXYGEN SATURATION: 99 % | BODY MASS INDEX: 23.96 KG/M2

## 2025-08-19 DIAGNOSIS — Z00.00 ANNUAL PHYSICAL EXAM: Primary | ICD-10-CM

## 2025-08-19 DIAGNOSIS — F41.9 ANXIETY: ICD-10-CM

## 2025-08-19 PROCEDURE — 99999 PR PBB SHADOW E&M-EST. PATIENT-LVL III: CPT | Mod: PBBFAC,,, | Performed by: STUDENT IN AN ORGANIZED HEALTH CARE EDUCATION/TRAINING PROGRAM
